# Patient Record
Sex: FEMALE | Race: WHITE | Employment: FULL TIME | URBAN - METROPOLITAN AREA
[De-identification: names, ages, dates, MRNs, and addresses within clinical notes are randomized per-mention and may not be internally consistent; named-entity substitution may affect disease eponyms.]

---

## 2018-07-23 ENCOUNTER — HOSPITAL ENCOUNTER (OUTPATIENT)
Dept: GENERAL RADIOLOGY | Age: 38
Discharge: HOME | End: 2018-07-23

## 2018-07-23 ENCOUNTER — HOSPITAL ENCOUNTER (OUTPATIENT)
Dept: MAMMOGRAPHY | Age: 38
Discharge: HOME | End: 2018-07-23
Payer: COMMERCIAL

## 2018-07-23 DIAGNOSIS — Z12.39 SCREENING BREAST EXAMINATION: ICD-10-CM

## 2018-07-23 PROCEDURE — 77067 SCR MAMMO BI INCL CAD: CPT

## 2018-08-01 ENCOUNTER — HOSPITAL ENCOUNTER (OUTPATIENT)
Dept: MAMMOGRAPHY | Age: 38
Discharge: HOME | End: 2018-08-01
Attending: FAMILY MEDICINE
Payer: COMMERCIAL

## 2018-08-01 DIAGNOSIS — R92.8 ABNORMAL MAMMOGRAM: ICD-10-CM

## 2018-08-01 PROCEDURE — 77065 DX MAMMO INCL CAD UNI: CPT

## 2018-08-03 ENCOUNTER — HOSPITAL ENCOUNTER (OUTPATIENT)
Dept: MAMMOGRAPHY | Age: 38
Discharge: HOME | End: 2018-08-03
Attending: FAMILY MEDICINE
Payer: COMMERCIAL

## 2018-08-03 DIAGNOSIS — R92.1 BREAST CALCIFICATIONS ON MAMMOGRAM: ICD-10-CM

## 2018-08-03 PROCEDURE — 88305 TISSUE EXAM BY PATHOLOGIST: CPT | Performed by: RADIOLOGY

## 2018-08-03 PROCEDURE — 74011000250 HC RX REV CODE- 250: Performed by: RADIOLOGY

## 2018-08-03 PROCEDURE — 77030030538 MAM STEREO VAC  BX BREAST LT 1ST LESION W/CLIP AND SPECIMEN

## 2018-08-03 PROCEDURE — 77065 DX MAMMO INCL CAD UNI: CPT

## 2018-08-03 PROCEDURE — 74011250636 HC RX REV CODE- 250/636: Performed by: RADIOLOGY

## 2018-08-03 RX ORDER — LIDOCAINE HYDROCHLORIDE 10 MG/ML
15 INJECTION INFILTRATION; PERINEURAL
Status: COMPLETED | OUTPATIENT
Start: 2018-08-03 | End: 2018-08-03

## 2018-08-03 RX ORDER — LIDOCAINE HYDROCHLORIDE AND EPINEPHRINE 10; 10 MG/ML; UG/ML
10 INJECTION, SOLUTION INFILTRATION; PERINEURAL ONCE
Status: COMPLETED | OUTPATIENT
Start: 2018-08-03 | End: 2018-08-03

## 2018-08-03 RX ADMIN — LIDOCAINE HYDROCHLORIDE AND EPINEPHRINE 100 MG: 10; 10 INJECTION, SOLUTION INFILTRATION; PERINEURAL at 09:50

## 2018-08-03 RX ADMIN — LIDOCAINE HYDROCHLORIDE 15 ML: 10 INJECTION, SOLUTION INFILTRATION; PERINEURAL at 09:50

## 2018-08-03 NOTE — PROGRESS NOTES
Patient tolerated left breast biopsy well with minimal bleeding. Biopsy site was bandaged in the usual fashion and discharge instructions were reviewed with the patient. She was provided a written copy as well. Advised the patient that results should be ready by Tuesday August 7 and that she can expect a phone call in the afternoon. Encouraged her to call with any questions or concerns.

## 2018-08-07 NOTE — PROGRESS NOTES
Pathology reviewed by Dr. Gayle Feliciano, called the patient and conveyed benign biopsy results. Advised patient that she should follow up with mammograms annually. Patient states that the biopsy site is healing well. She reports a small \"lump\" at the area of the biopsy that is somewhat sensitive. Advised her that it is probably a hematoma that formed and that it will resolve over the course of a few weeks. Encouraged patient to call with any questions or concerns.

## 2019-08-21 ENCOUNTER — HOSPITAL ENCOUNTER (OUTPATIENT)
Dept: MAMMOGRAPHY | Age: 39
Discharge: HOME | End: 2019-08-21
Payer: COMMERCIAL

## 2019-08-21 DIAGNOSIS — Z12.39 BREAST SCREENING: ICD-10-CM

## 2019-08-21 PROCEDURE — 77067 SCR MAMMO BI INCL CAD: CPT

## 2020-10-19 ENCOUNTER — TRANSCRIBE ORDER (OUTPATIENT)
Dept: SCHEDULING | Age: 40
End: 2020-10-19

## 2020-10-19 DIAGNOSIS — Z12.31 VISIT FOR SCREENING MAMMOGRAM: Primary | ICD-10-CM

## 2020-11-16 ENCOUNTER — HOSPITAL ENCOUNTER (OUTPATIENT)
Dept: MAMMOGRAPHY | Age: 40
Discharge: HOME | End: 2020-11-16
Payer: COMMERCIAL

## 2020-11-16 DIAGNOSIS — Z12.31 VISIT FOR SCREENING MAMMOGRAM: ICD-10-CM

## 2020-11-16 PROCEDURE — 77067 SCR MAMMO BI INCL CAD: CPT | Performed by: FAMILY MEDICINE

## 2022-06-03 LAB — MAMMOGRAPHY, EXTERNAL: NORMAL

## 2022-12-15 ENCOUNTER — APPOINTMENT (OUTPATIENT)
Dept: CT IMAGING | Age: 42
DRG: 390 | End: 2022-12-15
Attending: EMERGENCY MEDICINE
Payer: COMMERCIAL

## 2022-12-15 ENCOUNTER — HOSPITAL ENCOUNTER (INPATIENT)
Age: 42
LOS: 4 days | Discharge: HOME | DRG: 390 | End: 2022-12-20
Attending: EMERGENCY MEDICINE | Admitting: SURGERY
Payer: COMMERCIAL

## 2022-12-15 DIAGNOSIS — K56.609 SBO (SMALL BOWEL OBSTRUCTION) (HCC): Primary | ICD-10-CM

## 2022-12-15 LAB
ALBUMIN SERPL-MCNC: 3.8 G/DL (ref 3.5–5)
ALBUMIN/GLOB SERPL: 1.1 {RATIO} (ref 1.1–2.2)
ALP SERPL-CCNC: 90 U/L (ref 45–117)
ALT SERPL-CCNC: 21 U/L (ref 12–78)
ANION GAP SERPL CALC-SCNC: 11 MMOL/L (ref 5–15)
AST SERPL-CCNC: 22 U/L (ref 15–37)
BASOPHILS # BLD: 0.1 K/UL (ref 0–0.1)
BASOPHILS NFR BLD: 1 % (ref 0–1)
BILIRUB SERPL-MCNC: 0.4 MG/DL (ref 0.2–1)
BUN SERPL-MCNC: 9 MG/DL (ref 6–20)
BUN/CREAT SERPL: 11 (ref 12–20)
CALCIUM SERPL-MCNC: 9.5 MG/DL (ref 8.5–10.1)
CHLORIDE SERPL-SCNC: 106 MMOL/L (ref 97–108)
CO2 SERPL-SCNC: 25 MMOL/L (ref 21–32)
CREAT SERPL-MCNC: 0.79 MG/DL (ref 0.55–1.02)
DIFFERENTIAL METHOD BLD: ABNORMAL
EOSINOPHIL # BLD: 0.2 K/UL (ref 0–0.4)
EOSINOPHIL NFR BLD: 2 % (ref 0–7)
ERYTHROCYTE [DISTWIDTH] IN BLOOD BY AUTOMATED COUNT: 12.3 % (ref 11.5–14.5)
GLOBULIN SER CALC-MCNC: 3.6 G/DL (ref 2–4)
GLUCOSE SERPL-MCNC: 97 MG/DL (ref 65–100)
HCG UR QL: NEGATIVE
HCT VFR BLD AUTO: 40.5 % (ref 35–47)
HGB BLD-MCNC: 13.4 G/DL (ref 11.5–16)
IMM GRANULOCYTES # BLD AUTO: 0 K/UL (ref 0–0.04)
IMM GRANULOCYTES NFR BLD AUTO: 0 % (ref 0–0.5)
LIPASE SERPL-CCNC: 190 U/L (ref 73–393)
LYMPHOCYTES # BLD: 0.6 K/UL (ref 0.8–3.5)
LYMPHOCYTES NFR BLD: 8 % (ref 12–49)
MCH RBC QN AUTO: 29.8 PG (ref 26–34)
MCHC RBC AUTO-ENTMCNC: 33.1 G/DL (ref 30–36.5)
MCV RBC AUTO: 90.2 FL (ref 80–99)
MONOCYTES # BLD: 0.8 K/UL (ref 0–1)
MONOCYTES NFR BLD: 10 % (ref 5–13)
NEUTS SEG # BLD: 6.3 K/UL (ref 1.8–8)
NEUTS SEG NFR BLD: 79 % (ref 32–75)
NRBC # BLD: 0 K/UL (ref 0–0.01)
NRBC BLD-RTO: 0 PER 100 WBC
PLATELET # BLD AUTO: 238 K/UL (ref 150–400)
PMV BLD AUTO: 9.5 FL (ref 8.9–12.9)
POTASSIUM SERPL-SCNC: 3.5 MMOL/L (ref 3.5–5.1)
PROT SERPL-MCNC: 7.4 G/DL (ref 6.4–8.2)
RBC # BLD AUTO: 4.49 M/UL (ref 3.8–5.2)
RBC MORPH BLD: ABNORMAL
SODIUM SERPL-SCNC: 142 MMOL/L (ref 136–145)
WBC # BLD AUTO: 8 K/UL (ref 3.6–11)

## 2022-12-15 PROCEDURE — 96374 THER/PROPH/DIAG INJ IV PUSH: CPT

## 2022-12-15 PROCEDURE — 83690 ASSAY OF LIPASE: CPT

## 2022-12-15 PROCEDURE — 94762 N-INVAS EAR/PLS OXIMTRY CONT: CPT

## 2022-12-15 PROCEDURE — 81025 URINE PREGNANCY TEST: CPT

## 2022-12-15 PROCEDURE — 85025 COMPLETE CBC W/AUTO DIFF WBC: CPT

## 2022-12-15 PROCEDURE — 74177 CT ABD & PELVIS W/CONTRAST: CPT

## 2022-12-15 PROCEDURE — 74011250636 HC RX REV CODE- 250/636: Performed by: EMERGENCY MEDICINE

## 2022-12-15 PROCEDURE — 96375 TX/PRO/DX INJ NEW DRUG ADDON: CPT

## 2022-12-15 PROCEDURE — 36415 COLL VENOUS BLD VENIPUNCTURE: CPT

## 2022-12-15 PROCEDURE — 0D9670Z DRAINAGE OF STOMACH WITH DRAINAGE DEVICE, VIA NATURAL OR ARTIFICIAL OPENING: ICD-10-PCS | Performed by: SURGERY

## 2022-12-15 PROCEDURE — 99285 EMERGENCY DEPT VISIT HI MDM: CPT

## 2022-12-15 PROCEDURE — 80053 COMPREHEN METABOLIC PANEL: CPT

## 2022-12-15 RX ORDER — ONDANSETRON 2 MG/ML
4 INJECTION INTRAMUSCULAR; INTRAVENOUS
Status: COMPLETED | OUTPATIENT
Start: 2022-12-15 | End: 2022-12-15

## 2022-12-15 RX ORDER — FENTANYL CITRATE 50 UG/ML
25 INJECTION, SOLUTION INTRAMUSCULAR; INTRAVENOUS
Status: COMPLETED | OUTPATIENT
Start: 2022-12-15 | End: 2022-12-15

## 2022-12-15 RX ADMIN — SODIUM CHLORIDE 1000 ML: 9 INJECTION, SOLUTION INTRAVENOUS at 22:52

## 2022-12-15 RX ADMIN — FENTANYL CITRATE 25 MCG: 50 INJECTION, SOLUTION INTRAMUSCULAR; INTRAVENOUS at 22:47

## 2022-12-15 RX ADMIN — ONDANSETRON 4 MG: 2 INJECTION INTRAMUSCULAR; INTRAVENOUS at 22:47

## 2022-12-16 ENCOUNTER — APPOINTMENT (OUTPATIENT)
Dept: GENERAL RADIOLOGY | Age: 42
DRG: 390 | End: 2022-12-16
Attending: EMERGENCY MEDICINE
Payer: COMMERCIAL

## 2022-12-16 PROBLEM — Z85.048 HISTORY OF RECTAL CANCER: Status: ACTIVE | Noted: 2022-12-16

## 2022-12-16 PROBLEM — K56.609 INTESTINAL OBSTRUCTION (HCC): Status: ACTIVE | Noted: 2022-12-16

## 2022-12-16 PROBLEM — F41.9 ANXIETY: Status: ACTIVE | Noted: 2022-12-16

## 2022-12-16 PROBLEM — Z93.3 COLOSTOMY IN PLACE (HCC): Status: ACTIVE | Noted: 2022-12-16

## 2022-12-16 PROCEDURE — 99223 1ST HOSP IP/OBS HIGH 75: CPT | Performed by: SURGERY

## 2022-12-16 PROCEDURE — 74011250637 HC RX REV CODE- 250/637: Performed by: SURGERY

## 2022-12-16 PROCEDURE — 74011250636 HC RX REV CODE- 250/636: Performed by: EMERGENCY MEDICINE

## 2022-12-16 PROCEDURE — 65270000029 HC RM PRIVATE

## 2022-12-16 PROCEDURE — 74011250636 HC RX REV CODE- 250/636: Performed by: SURGERY

## 2022-12-16 PROCEDURE — 74011000636 HC RX REV CODE- 636: Performed by: SURGERY

## 2022-12-16 PROCEDURE — 96376 TX/PRO/DX INJ SAME DRUG ADON: CPT

## 2022-12-16 PROCEDURE — 74018 RADEX ABDOMEN 1 VIEW: CPT

## 2022-12-16 PROCEDURE — 74011000636 HC RX REV CODE- 636: Performed by: EMERGENCY MEDICINE

## 2022-12-16 PROCEDURE — 74011000250 HC RX REV CODE- 250: Performed by: EMERGENCY MEDICINE

## 2022-12-16 RX ORDER — SODIUM CHLORIDE AND POTASSIUM CHLORIDE 150; 900 MG/100ML; MG/100ML
INJECTION, SOLUTION INTRAVENOUS CONTINUOUS
Status: DISCONTINUED | OUTPATIENT
Start: 2022-12-16 | End: 2022-12-20 | Stop reason: HOSPADM

## 2022-12-16 RX ORDER — LORAZEPAM 1 MG/1
1 TABLET ORAL
Status: DISCONTINUED | OUTPATIENT
Start: 2022-12-16 | End: 2022-12-20 | Stop reason: HOSPADM

## 2022-12-16 RX ORDER — DIPHENHYDRAMINE HYDROCHLORIDE 50 MG/ML
12.5 INJECTION, SOLUTION INTRAMUSCULAR; INTRAVENOUS
Status: DISPENSED | OUTPATIENT
Start: 2022-12-16 | End: 2022-12-17

## 2022-12-16 RX ORDER — LIDOCAINE HYDROCHLORIDE 40 MG/ML
SOLUTION TOPICAL
Status: COMPLETED | OUTPATIENT
Start: 2022-12-16 | End: 2022-12-16

## 2022-12-16 RX ORDER — ONDANSETRON 2 MG/ML
4 INJECTION INTRAMUSCULAR; INTRAVENOUS
Status: DISCONTINUED | OUTPATIENT
Start: 2022-12-16 | End: 2022-12-20 | Stop reason: HOSPADM

## 2022-12-16 RX ORDER — DIPHENHYDRAMINE HYDROCHLORIDE 50 MG/ML
12.5 INJECTION, SOLUTION INTRAMUSCULAR; INTRAVENOUS
Status: DISCONTINUED | OUTPATIENT
Start: 2022-12-16 | End: 2022-12-20 | Stop reason: HOSPADM

## 2022-12-16 RX ORDER — HYDROCODONE BITARTRATE AND ACETAMINOPHEN 10; 325 MG/1; MG/1
1 TABLET ORAL
Status: DISCONTINUED | OUTPATIENT
Start: 2022-12-16 | End: 2022-12-20 | Stop reason: HOSPADM

## 2022-12-16 RX ORDER — NALOXONE HYDROCHLORIDE 0.4 MG/ML
0.4 INJECTION, SOLUTION INTRAMUSCULAR; INTRAVENOUS; SUBCUTANEOUS AS NEEDED
Status: DISCONTINUED | OUTPATIENT
Start: 2022-12-16 | End: 2022-12-20 | Stop reason: HOSPADM

## 2022-12-16 RX ORDER — HYDROMORPHONE HYDROCHLORIDE 1 MG/ML
.5-1 INJECTION, SOLUTION INTRAMUSCULAR; INTRAVENOUS; SUBCUTANEOUS
Status: DISCONTINUED | OUTPATIENT
Start: 2022-12-16 | End: 2022-12-20 | Stop reason: HOSPADM

## 2022-12-16 RX ORDER — HYDROCODONE BITARTRATE AND ACETAMINOPHEN 5; 325 MG/1; MG/1
1 TABLET ORAL
Status: DISCONTINUED | OUTPATIENT
Start: 2022-12-16 | End: 2022-12-20 | Stop reason: HOSPADM

## 2022-12-16 RX ORDER — ACETAMINOPHEN 325 MG/1
650 TABLET ORAL
Status: DISCONTINUED | OUTPATIENT
Start: 2022-12-16 | End: 2022-12-20 | Stop reason: HOSPADM

## 2022-12-16 RX ORDER — FENTANYL CITRATE 50 UG/ML
25 INJECTION, SOLUTION INTRAMUSCULAR; INTRAVENOUS
Status: COMPLETED | OUTPATIENT
Start: 2022-12-16 | End: 2022-12-16

## 2022-12-16 RX ORDER — PROCHLORPERAZINE EDISYLATE 5 MG/ML
10 INJECTION INTRAMUSCULAR; INTRAVENOUS
Status: COMPLETED | OUTPATIENT
Start: 2022-12-16 | End: 2022-12-16

## 2022-12-16 RX ADMIN — DIATRIZOATE MEGLUMINE AND DIATRIZOATE SODIUM 80 ML: 660; 100 LIQUID ORAL; RECTAL at 21:32

## 2022-12-16 RX ADMIN — IOPAMIDOL 100 ML: 755 INJECTION, SOLUTION INTRAVENOUS at 00:07

## 2022-12-16 RX ADMIN — PROCHLORPERAZINE EDISYLATE 10 MG: 5 INJECTION INTRAMUSCULAR; INTRAVENOUS at 03:43

## 2022-12-16 RX ADMIN — FENTANYL CITRATE 25 MCG: 50 INJECTION, SOLUTION INTRAMUSCULAR; INTRAVENOUS at 00:48

## 2022-12-16 RX ADMIN — POTASSIUM CHLORIDE AND SODIUM CHLORIDE: 900; 150 INJECTION, SOLUTION INTRAVENOUS at 02:16

## 2022-12-16 RX ADMIN — POTASSIUM CHLORIDE AND SODIUM CHLORIDE: 900; 150 INJECTION, SOLUTION INTRAVENOUS at 11:29

## 2022-12-16 RX ADMIN — LORAZEPAM 1 MG: 1 TABLET ORAL at 08:53

## 2022-12-16 RX ADMIN — ONDANSETRON 4 MG: 2 INJECTION INTRAMUSCULAR; INTRAVENOUS at 02:45

## 2022-12-16 RX ADMIN — DIPHENHYDRAMINE HYDROCHLORIDE 12.5 MG: 50 INJECTION, SOLUTION INTRAMUSCULAR; INTRAVENOUS at 08:52

## 2022-12-16 RX ADMIN — LIDOCAINE HYDROCHLORIDE: 40 SOLUTION ORAL at 01:39

## 2022-12-16 RX ADMIN — HYDROMORPHONE HYDROCHLORIDE 0.5 MG: 1 INJECTION, SOLUTION INTRAMUSCULAR; INTRAVENOUS; SUBCUTANEOUS at 03:56

## 2022-12-16 NOTE — ED PROVIDER NOTES
EMERGENCY DEPARTMENT HISTORY AND PHYSICAL EXAM     ----------------------------------------------------------------------------  Please note that this dictation was completed with [employer], the computer voice recognition software. Quite often unanticipated grammatical, syntax, homophones, and other interpretive errors are inadvertently transcribed by the computer software. Please disregard these errors. Please excuse any errors that have escaped final proofreading  ----------------------------------------------------------------------------      Date: 12/15/2022  Patient Name: Ingrid Molina    History of Presenting Illness     Chief Complaint   Patient presents with    Abdominal Pain     Patient hx of rectal cancer with colostomy placement in September. Has only had couple tablespoons of output today and has severe abdominal cramping with a couple episodes of vomiting. Took milk of magnesia PTA and says it's helping some but not much. No output in colostomy in triage. Not had chemo since August.        History Provided By:  Patient    HPI: Ingrid Molina is a 38 y.o. female, with significant pmhx of rectal cancer status postchemotherapy and resection with ostomy, who presents via private vehicle to the ED with c/o severe abdominal cramping that comes in waves with associated nausea and vomiting and decreased ostomy output in the last 24 hours. Has not had any gas come out of her bag either. Patient also specifically denies any associated fevers, chills, CP, SOB, urinary sxs, or headache. Social Hx: denies tobacco  denies EtOH , denies recreational/Illicit Drugs    There are no other complaints, changes, or physical findings at this time. PCP: Dulce Maria Jean-Baptiste MD    Allergies   Allergen Reactions    Latex, Natural Rubber Rash    Morphine Nausea and Vomiting           Past History     Past Medical History:  No past medical history on file.     Past Surgical History:  Past Surgical History:   Procedure Laterality Date    HX BREAST BIOPSY Left     benign       Family History:  No family history on file. Social History: Allergies: Allergies   Allergen Reactions    Latex, Natural Rubber Rash    Morphine Nausea and Vomiting         Review of Systems   Review of Systems   Constitutional: Negative. Negative for fever. Eyes: Negative. Respiratory: Negative. Negative for shortness of breath. Cardiovascular:  Negative for chest pain. Gastrointestinal:  Positive for abdominal pain, nausea and vomiting. Endocrine: Negative. Genitourinary: Negative. Negative for difficulty urinating, dysuria and hematuria. Musculoskeletal: Negative. Skin: Negative. Neurological: Negative. Psychiatric/Behavioral:  Negative for suicidal ideas. Physical Exam   Physical Exam  Vitals and nursing note reviewed. Constitutional:       General: She is in acute distress. Appearance: She is well-developed. She is not diaphoretic. HENT:      Head: Normocephalic and atraumatic. Nose: Nose normal.   Eyes:      General: No scleral icterus. Conjunctiva/sclera: Conjunctivae normal.   Neck:      Trachea: No tracheal deviation. Cardiovascular:      Rate and Rhythm: Normal rate and regular rhythm. Heart sounds: Normal heart sounds. No murmur heard. No friction rub. Pulmonary:      Effort: Pulmonary effort is normal. No respiratory distress. Breath sounds: Normal breath sounds. No stridor. No wheezing or rales. Abdominal:      General: Bowel sounds are absent. There is no distension. Palpations: Abdomen is soft. Tenderness: There is no abdominal tenderness. Comments: Pink, patent os, no stool in bag, no gas   Musculoskeletal:         General: No tenderness. Normal range of motion. Cervical back: Normal range of motion. Skin:     General: Skin is warm and dry. Findings: No rash.    Neurological:      Mental Status: She is alert and oriented to person, place, and time. Cranial Nerves: No cranial nerve deficit. Psychiatric:         Behavior: Behavior normal.         Thought Content: Thought content normal.         Judgment: Judgment normal.         Diagnostic Study Results     Labs -     Recent Results (from the past 12 hour(s))   METABOLIC PANEL, COMPREHENSIVE    Collection Time: 12/15/22  9:53 PM   Result Value Ref Range    Sodium 142 136 - 145 mmol/L    Potassium 3.5 3.5 - 5.1 mmol/L    Chloride 106 97 - 108 mmol/L    CO2 25 21 - 32 mmol/L    Anion gap 11 5 - 15 mmol/L    Glucose 97 65 - 100 mg/dL    BUN 9 6 - 20 MG/DL    Creatinine 0.79 0.55 - 1.02 MG/DL    BUN/Creatinine ratio 11 (L) 12 - 20      eGFR >60 >60 ml/min/1.73m2    Calcium 9.5 8.5 - 10.1 MG/DL    Bilirubin, total 0.4 0.2 - 1.0 MG/DL    ALT (SGPT) 21 12 - 78 U/L    AST (SGOT) 22 15 - 37 U/L    Alk. phosphatase 90 45 - 117 U/L    Protein, total 7.4 6.4 - 8.2 g/dL    Albumin 3.8 3.5 - 5.0 g/dL    Globulin 3.6 2.0 - 4.0 g/dL    A-G Ratio 1.1 1.1 - 2.2     CBC WITH AUTOMATED DIFF    Collection Time: 12/15/22  9:53 PM   Result Value Ref Range    WBC 8.0 3.6 - 11.0 K/uL    RBC 4.49 3.80 - 5.20 M/uL    HGB 13.4 11.5 - 16.0 g/dL    HCT 40.5 35.0 - 47.0 %    MCV 90.2 80.0 - 99.0 FL    MCH 29.8 26.0 - 34.0 PG    MCHC 33.1 30.0 - 36.5 g/dL    RDW 12.3 11.5 - 14.5 %    PLATELET 942 842 - 876 K/uL    MPV 9.5 8.9 - 12.9 FL    NRBC 0.0 0  WBC    ABSOLUTE NRBC 0.00 0.00 - 0.01 K/uL    NEUTROPHILS 79 (H) 32 - 75 %    LYMPHOCYTES 8 (L) 12 - 49 %    MONOCYTES 10 5 - 13 %    EOSINOPHILS 2 0 - 7 %    BASOPHILS 1 0 - 1 %    IMMATURE GRANULOCYTES 0 0.0 - 0.5 %    ABS. NEUTROPHILS 6.3 1.8 - 8.0 K/UL    ABS. LYMPHOCYTES 0.6 (L) 0.8 - 3.5 K/UL    ABS. MONOCYTES 0.8 0.0 - 1.0 K/UL    ABS. EOSINOPHILS 0.2 0.0 - 0.4 K/UL    ABS. BASOPHILS 0.1 0.0 - 0.1 K/UL    ABS. IMM.  GRANS. 0.0 0.00 - 0.04 K/UL    DF SMEAR SCANNED      RBC COMMENTS NORMOCYTIC, NORMOCHROMIC     LIPASE    Collection Time: 12/15/22  9:53 PM Result Value Ref Range    Lipase 190 73 - 393 U/L   HCG URINE, QL. - POC    Collection Time: 12/15/22 10:58 PM   Result Value Ref Range    Pregnancy test,urine (POC) Negative NEG         Radiologic Studies -   CT ABD PELV W CONT   Final Result   Small bowel obstruction with a transition point in the mid lower abdomen likely   due to adhesion. Moderate to large amount of colonic stool with a left lower   abdomen ostomy. CT Results  (Last 48 hours)                 12/16/22 0006  CT ABD PELV W CONT Final result    Impression:  Small bowel obstruction with a transition point in the mid lower abdomen likely   due to adhesion. Moderate to large amount of colonic stool with a left lower   abdomen ostomy. Narrative:  EXAM:  CT ABD PELV W CONT       INDICATION: Abdominal pain. Rectal cancer. Decreased ostomy output. COMPARISON: None. TECHNIQUE: Helical CT of the abdomen  and pelvis  following the uneventful   intravenous administration of nonionic contrast.  Coronal and sagittal reformats   are performed. CT dose reduction was achieved through use of a standardized   protocol tailored for this examination and automatic exposure control for dose   modulation. FINDINGS:    The visualized lung bases demonstrate no mass or consolidation. The heart size   is normal. There is no pericardial or pleural effusion. There is a tiny low density right liver lesion that is too small to characterize   but likely represents a cyst for which no imaging follow-up is recommended. The   spleen, pancreas, and adrenal glands are normal. The gall bladder is present    without intra- or extra-hepatic biliary dilatation. The kidneys are symmetric without hydronephrosis. There are dilated fluid-filled small bowel loops measuring up to 3.0 cm in   diameter with a transition point in the mid lower abdomen. The distal small   bowel and colon are nondilated.  There is a moderate to large amount of colonic   stool. Distal colon surgical changes are noted with a left lower abdomen ostomy. The appendix is normal.         There are no enlarged lymph nodes. There is no free fluid or free air. The   aorta is normal in caliber. The urinary bladder is normal. A uterine fibroid measures 4.1 cm. There is no aggressive bony lesion. CXR Results  (Last 48 hours)      None              Medical Decision Making   I am the first provider for this patient. I reviewed the vital signs, available nursing notes, past medical history, past surgical history, family history and social history. Vital Signs-Reviewed the patient's vital signs. Patient Vitals for the past 12 hrs:   Temp Pulse Resp BP SpO2   12/16/22 0045 -- 68 13 122/86 99 %   12/15/22 2315 -- 62 14 116/79 96 %   12/15/22 2245 -- 65 12 -- 100 %   12/15/22 2230 -- 70 15 -- 100 %   12/15/22 2217 -- (!) 59 15 116/71 99 %   12/15/22 2200 -- 65 20 -- 100 %   12/15/22 2132 98.4 °F (36.9 °C) 77 20 114/79 100 %       Pulse Oximetry Analysis - 99% on RA, normal  Rate: 77 bpm  Rhythm: Normal sinus    ED Course:   Initial assessment performed. The patients presenting problems have been discussed, and they are in agreement with the care plan formulated and outlined with them. I have encouraged them to ask questions as they arise throughout their visit. I reviewed the nursing notes and and vital signs from today's visit, as well as the electronic medical record system for any past medical records that were available that may contribute to the patients current condition, including previous notes from [place] for recent resection    Nursing notes will be reviewed as they become available in realtime while the pt has been in the ED. Arabella Bailey MD  I personally reviewed/interpreted pt's imaging. Agree with official read by radiology as noted above.   Arabella Bailey MD      Provider Notes (Medical Decision Making):     DDX:  Small bowel obstruction, dehydration, electrolyte derangement    CONSULT NOTE:   1:06 AM  Bernard Blackmon MD spoke with Dr. Ab Escobar,   Specialty: Kerry Escobar due to p.o. Discussed pt's HPI and available diagnostic results thus far, specifically noting normal white count, electrolytes. Consultant recommends NG tube and will admit. Bernard Blackmon MD      Impression/ Plan:  Patient is a 47-year-old female with history of rectal cancer status postchemotherapy and resection now presenting with severe abdominal pain, cramping and nausea vomiting. CT confirms initial concern for small bowel obstruction. Have consulted with general surgery who recommends NG tube placement and will admit to their service. ADMISSION NOTE:  1:06 AM  Patient is being admitted to the hospital by Dr. Ab Escobar. The results of their tests and reasons for their admission have been discussed with them and/or available family. They convey agreement and understanding for the need to be admitted and for their admission diagnosis. Bernard Blackmon MD           Critical Care Time:     none      Diagnosis     Clinical Impression:   1. SBO (small bowel obstruction) (HCC)        PLAN:  1.  Admit to hospitalist

## 2022-12-16 NOTE — PROGRESS NOTES
Pt is a 39 yo female with dx of SBO. Pt hx of rectal cancer, dx'd early 2022 and through [facility] in [place] and [address] [place] [address],  pt had neoadjuvant chemorx and XRT then Sept 2022 had robotic APR and pelvic flap with end colostomy. Pt now with N/V and SBO on CT. CBC and electrolytes ok. Pt cancer was T4N2 on review of records available in care everywhere. Plan NGT, IVF,  Gastrograffin challenge with FU AXR. Plan non op initial management.

## 2022-12-16 NOTE — ED NOTES
Received report from outgoing nurse jahaira   Patient is well and stable GCS15  Conscious coherent conversant alert   Lying comfortably in bed   Iv cannula intact   No complaints of pain

## 2022-12-16 NOTE — ED NOTES
Patient report given to Shilpa Grover   Patient is well and stable GCS15  Not in any form of distress   No complaints of pain   NGT intact on NPO   Iv cannula infusing well

## 2022-12-16 NOTE — H&P
Surgery History and Physical    Subjective:      Leslee Joy is a 38 y.o. female who presents for evaluation of vaginal obstruction    Pt is a 37 yo female with dx of SBO. Pt hx of rectal cancer, dx'd early 2022 and through [facility] in [place] and [address] [place] [address],  pt had neoadjuvant chemorx and XRT then Sept 2022 had robotic APR and pelvic flap with end colostomy. Pt now with N/V and SBO on CT. CBC and electrolytes ok. Pt cancer was T4N2 on review of records available in care everywhere. Patient with NG tube in place and minimal mucousy output, patient also noted decreased colostomy output. Apparently during her surgery at [facility] she was discharged 2 days postop but readmitted several days after discharge with ileus, hospitalized for 4 days and then discharged home. Plan NGT, IVF,  Gastrograffin challenge with FU AXR. Plan non op initial management. Patient currently reports no nausea and no abdominal pain. Has been using a high-fiber diet at home    LAB REVIEW:    Recent Results (from the past 48 hour(s))   METABOLIC PANEL, COMPREHENSIVE    Collection Time: 12/15/22  9:53 PM   Result Value Ref Range    Sodium 142 136 - 145 mmol/L    Potassium 3.5 3.5 - 5.1 mmol/L    Chloride 106 97 - 108 mmol/L    CO2 25 21 - 32 mmol/L    Anion gap 11 5 - 15 mmol/L    Glucose 97 65 - 100 mg/dL    BUN 9 6 - 20 MG/DL    Creatinine 0.79 0.55 - 1.02 MG/DL    BUN/Creatinine ratio 11 (L) 12 - 20      eGFR >60 >60 ml/min/1.73m2    Calcium 9.5 8.5 - 10.1 MG/DL    Bilirubin, total 0.4 0.2 - 1.0 MG/DL    ALT (SGPT) 21 12 - 78 U/L    AST (SGOT) 22 15 - 37 U/L    Alk.  phosphatase 90 45 - 117 U/L    Protein, total 7.4 6.4 - 8.2 g/dL    Albumin 3.8 3.5 - 5.0 g/dL    Globulin 3.6 2.0 - 4.0 g/dL    A-G Ratio 1.1 1.1 - 2.2     CBC WITH AUTOMATED DIFF    Collection Time: 12/15/22  9:53 PM   Result Value Ref Range    WBC 8.0 3.6 - 11.0 K/uL    RBC 4.49 3.80 - 5.20 M/uL    HGB 13.4 11.5 - 16.0 g/dL    HCT 40.5 35.0 - 47.0 % MCV 90.2 80.0 - 99.0 FL    MCH 29.8 26.0 - 34.0 PG    MCHC 33.1 30.0 - 36.5 g/dL    RDW 12.3 11.5 - 14.5 %    PLATELET 957 708 - 128 K/uL    MPV 9.5 8.9 - 12.9 FL    NRBC 0.0 0  WBC    ABSOLUTE NRBC 0.00 0.00 - 0.01 K/uL    NEUTROPHILS 79 (H) 32 - 75 %    LYMPHOCYTES 8 (L) 12 - 49 %    MONOCYTES 10 5 - 13 %    EOSINOPHILS 2 0 - 7 %    BASOPHILS 1 0 - 1 %    IMMATURE GRANULOCYTES 0 0.0 - 0.5 %    ABS. NEUTROPHILS 6.3 1.8 - 8.0 K/UL    ABS. LYMPHOCYTES 0.6 (L) 0.8 - 3.5 K/UL    ABS. MONOCYTES 0.8 0.0 - 1.0 K/UL    ABS. EOSINOPHILS 0.2 0.0 - 0.4 K/UL    ABS. BASOPHILS 0.1 0.0 - 0.1 K/UL    ABS. IMM. GRANS. 0.0 0.00 - 0.04 K/UL    DF SMEAR SCANNED      RBC COMMENTS NORMOCYTIC, NORMOCHROMIC     LIPASE    Collection Time: 12/15/22  9:53 PM   Result Value Ref Range    Lipase 190 73 - 393 U/L   HCG URINE, QL. - POC    Collection Time: 12/15/22 10:58 PM   Result Value Ref Range    Pregnancy test,urine (POC) Negative NEG         XR Results (maximum last 3): Results from [place] encounter on 12/15/22    XR ABD (KUB)    Impression  The enteric tube terminates in the stomach. CT Results (maximum last 3): Results from [place] encounter on 12/15/22    CT ABD PELV W CONT    Impression  Small bowel obstruction with a transition point in the mid lower abdomen likely  due to adhesion. Moderate to large amount of colonic stool with a left lower  abdomen ostomy. MRI Results (maximum last 3): No results found for this or any previous visit. Nuclear Medicine Results (maximum last 3): No results found for this or any previous visit. US Results (maximum last 3): No results found for this or any previous visit. No past medical history on file. Past Surgical History:   Procedure Laterality Date    HX BREAST BIOPSY Left     benign      No family history on file.   Social History     Tobacco Use    Smoking status: Not on file    Smokeless tobacco: Not on file   Substance Use Topics Alcohol use: Not on file      Prior to Admission medications    Not on File      Allergies   Allergen Reactions    Latex, Natural Rubber Rash    Morphine Nausea and Vomiting       Review of Systems   Constitutional:  Positive for fatigue. Negative for unexpected weight change. Respiratory: Negative. Cardiovascular: Negative. Gastrointestinal:         Previous nausea vomiting and abdominal pain are improved. Patient very anxious appearing   All other systems reviewed and are negative. Objective:     Patient Vitals for the past 8 hrs:   BP Temp Pulse Resp SpO2   22 0731 114/72 98 °F (36.7 °C) 74 23 97 %   22 0429 124/80 98.3 °F (36.8 °C) 61 14 94 %   22 0330 -- -- 86 -- 99 %   22 0242 118/79 -- 95 18 97 %   22 0214 -- -- 89 19 98 %   22 0135 -- -- 80 15 95 %   22 0045 122/86 -- 68 13 99 %       Temp (24hrs), Av.2 °F (36.8 °C), Min:98 °F (36.7 °C), Max:98.4 °F (36.9 °C)      Physical Exam  Vitals and nursing note reviewed. Constitutional:       General: She is not in acute distress. Appearance: She is not ill-appearing. Comments: Thin, very anxious appearing   HENT:      Mouth/Throat:      Mouth: Mucous membranes are dry. Eyes:      Conjunctiva/sclera: Conjunctivae normal.   Cardiovascular:      Rate and Rhythm: Normal rate and regular rhythm. Pulmonary:      Effort: Pulmonary effort is normal.      Breath sounds: Normal breath sounds. Abdominal:      General: Abdomen is flat. Palpations: Abdomen is soft. Comments: Eagle tattoo, pink normally butting colostomy left lower abdomen, healed surgical robotic scars, nontender, flat, no peritoneal signs or guarding   Musculoskeletal:         General: Normal range of motion. Skin:     General: Skin is warm and dry. Neurological:      General: No focal deficit present. Mental Status: She is alert and oriented to person, place, and time. Psychiatric:         Thought Content:  Thought content normal.         Judgment: Judgment normal.      Comments: Very anxious appearing       Assessment:     Active Problems:    Intestinal obstruction ([address] (12/16/2022)      Anxiety (12/16/2022)      History of rectal cancer (12/16/2022)      Colostomy in place [facility]) (12/16/2022)      Plan:     N.p.o., NG tube, IV fluid resuscitation, Gastrografin challenge with follow-up x-rays. Reviewed the plan, likelihood this should improve on its own, potential for surgical intervention if needed laparoscopic or laparotomy, patient understands that at her previous low anterior resection as well as radiation to great risk of adhesions, hopefully this will improve on its own. Assured patient that we were more than capable to take care of her here but if she preferred we can transfer her to [facility] or [facility] but I thought that was not necessary at this time unless it was patient preference.   Patient seemed very anxious about all of this,I offered to speak to her  when he returned patient desired    FACE TO FACE TIME: (Review of imaging, hx, records, EMR, discussion with pt and providers, and  pt exam)                                    76    minutes reviewing old records and imaging through the course of the the time early this morning and at patient's bedside    Signed By: Rosales Horn MD   [facility]    December 16, 2022

## 2022-12-17 ENCOUNTER — APPOINTMENT (OUTPATIENT)
Dept: GENERAL RADIOLOGY | Age: 42
DRG: 390 | End: 2022-12-17
Attending: SURGERY
Payer: COMMERCIAL

## 2022-12-17 LAB
ANION GAP SERPL CALC-SCNC: 5 MMOL/L (ref 5–15)
BASOPHILS # BLD: 0 K/UL (ref 0–0.1)
BASOPHILS NFR BLD: 1 % (ref 0–1)
BUN SERPL-MCNC: 9 MG/DL (ref 6–20)
BUN/CREAT SERPL: 13 (ref 12–20)
CALCIUM SERPL-MCNC: 8.2 MG/DL (ref 8.5–10.1)
CHLORIDE SERPL-SCNC: 113 MMOL/L (ref 97–108)
CO2 SERPL-SCNC: 26 MMOL/L (ref 21–32)
CREAT SERPL-MCNC: 0.67 MG/DL (ref 0.55–1.02)
DIFFERENTIAL METHOD BLD: ABNORMAL
EOSINOPHIL # BLD: 0.1 K/UL (ref 0–0.4)
EOSINOPHIL NFR BLD: 2 % (ref 0–7)
ERYTHROCYTE [DISTWIDTH] IN BLOOD BY AUTOMATED COUNT: 12.4 % (ref 11.5–14.5)
GLUCOSE BLD STRIP.AUTO-MCNC: 91 MG/DL (ref 65–117)
GLUCOSE BLD STRIP.AUTO-MCNC: 96 MG/DL (ref 65–117)
GLUCOSE SERPL-MCNC: 87 MG/DL (ref 65–100)
HCT VFR BLD AUTO: 38.3 % (ref 35–47)
HGB BLD-MCNC: 12.2 G/DL (ref 11.5–16)
IMM GRANULOCYTES # BLD AUTO: 0 K/UL (ref 0–0.04)
IMM GRANULOCYTES NFR BLD AUTO: 1 % (ref 0–0.5)
LYMPHOCYTES # BLD: 0.5 K/UL (ref 0.8–3.5)
LYMPHOCYTES NFR BLD: 9 % (ref 12–49)
MCH RBC QN AUTO: 29.9 PG (ref 26–34)
MCHC RBC AUTO-ENTMCNC: 31.9 G/DL (ref 30–36.5)
MCV RBC AUTO: 93.9 FL (ref 80–99)
MONOCYTES # BLD: 0.7 K/UL (ref 0–1)
MONOCYTES NFR BLD: 12 % (ref 5–13)
NEUTS SEG # BLD: 4.4 K/UL (ref 1.8–8)
NEUTS SEG NFR BLD: 76 % (ref 32–75)
NRBC # BLD: 0 K/UL (ref 0–0.01)
NRBC BLD-RTO: 0 PER 100 WBC
PLATELET # BLD AUTO: 201 K/UL (ref 150–400)
PMV BLD AUTO: 9.1 FL (ref 8.9–12.9)
POTASSIUM SERPL-SCNC: 4.1 MMOL/L (ref 3.5–5.1)
RBC # BLD AUTO: 4.08 M/UL (ref 3.8–5.2)
SERVICE CMNT-IMP: NORMAL
SERVICE CMNT-IMP: NORMAL
SODIUM SERPL-SCNC: 144 MMOL/L (ref 136–145)
WBC # BLD AUTO: 5.8 K/UL (ref 3.6–11)

## 2022-12-17 PROCEDURE — 85025 COMPLETE CBC W/AUTO DIFF WBC: CPT

## 2022-12-17 PROCEDURE — 74011250636 HC RX REV CODE- 250/636: Performed by: SURGERY

## 2022-12-17 PROCEDURE — 65270000029 HC RM PRIVATE

## 2022-12-17 PROCEDURE — 74011250637 HC RX REV CODE- 250/637: Performed by: SURGERY

## 2022-12-17 PROCEDURE — 36415 COLL VENOUS BLD VENIPUNCTURE: CPT

## 2022-12-17 PROCEDURE — 80048 BASIC METABOLIC PNL TOTAL CA: CPT

## 2022-12-17 PROCEDURE — 74019 RADEX ABDOMEN 2 VIEWS: CPT

## 2022-12-17 PROCEDURE — 82962 GLUCOSE BLOOD TEST: CPT

## 2022-12-17 PROCEDURE — 99231 SBSQ HOSP IP/OBS SF/LOW 25: CPT | Performed by: SURGERY

## 2022-12-17 RX ADMIN — HYDROMORPHONE HYDROCHLORIDE 1 MG: 1 INJECTION, SOLUTION INTRAMUSCULAR; INTRAVENOUS; SUBCUTANEOUS at 11:44

## 2022-12-17 RX ADMIN — LORAZEPAM 1 MG: 1 TABLET ORAL at 00:20

## 2022-12-17 RX ADMIN — ONDANSETRON 4 MG: 2 INJECTION INTRAMUSCULAR; INTRAVENOUS at 17:30

## 2022-12-17 RX ADMIN — ONDANSETRON 4 MG: 2 INJECTION INTRAMUSCULAR; INTRAVENOUS at 10:42

## 2022-12-17 RX ADMIN — HYDROMORPHONE HYDROCHLORIDE 1 MG: 1 INJECTION, SOLUTION INTRAMUSCULAR; INTRAVENOUS; SUBCUTANEOUS at 17:29

## 2022-12-17 RX ADMIN — POTASSIUM CHLORIDE AND SODIUM CHLORIDE: 900; 150 INJECTION, SOLUTION INTRAVENOUS at 03:46

## 2022-12-17 RX ADMIN — POTASSIUM CHLORIDE AND SODIUM CHLORIDE: 900; 150 INJECTION, SOLUTION INTRAVENOUS at 11:44

## 2022-12-17 RX ADMIN — ONDANSETRON 4 MG: 2 INJECTION INTRAMUSCULAR; INTRAVENOUS at 05:19

## 2022-12-17 RX ADMIN — HYDROMORPHONE HYDROCHLORIDE 1 MG: 1 INJECTION, SOLUTION INTRAMUSCULAR; INTRAVENOUS; SUBCUTANEOUS at 00:41

## 2022-12-17 RX ADMIN — HYDROMORPHONE HYDROCHLORIDE 1 MG: 1 INJECTION, SOLUTION INTRAMUSCULAR; INTRAVENOUS; SUBCUTANEOUS at 05:17

## 2022-12-17 NOTE — PROGRESS NOTES
Orly Carlos RN primary nurse requested me to look at NG tube as pt c/o pain and NG tube not draining. NG tube was confirmed placement via xray this AM. Wall suction is functioning. Pt refusing pain medication at this time. Notified Dr. Meaghan Goodman of pt's complaints of pain and pt's concern that contrast was given via NG tube and has not drained out. Dr. Meaghan Goodman to see pt soon.

## 2022-12-17 NOTE — PROGRESS NOTES
End of Shift Note    Bedside shift change report given to Len Silva (oncoming nurse) by Garcia Melvin RN (offgoing nurse). Report included the following information SBAR, Kardex, Procedure Summary, Intake/Output, MAR, and Cardiac Rhythm Normal sinus rhythm. Shift worked:  [id number]     Shift summary and any significant changes:     Patient was comfortable with no complaints until contrast was given and NG was clamped, then pain returned. Patient was given IV dilaudid x2. Surgeon paged and writer instructed to connect NG back to suction a few hours earlier than the planned six hour clamp. Pt up to bathroom several times during night to void.  slept at bedside. Radiology did not complete patient's xray as ordered. Xray was done at bedside, not flat and erect as ordered. Tech stated someone told her patient could not stand but writer informed tech that was not true and offered to bring patient down for test. [employer] stated scan was already done. Concerns for physician to address:  none     Zone phone for oncoming shift:   [id number]       Activity:  Activity Level: Up ad harshil  Number times ambulated in hallways past shift: 0  Number of times OOB to chair past shift: 3    Cardiac:   Cardiac Monitoring: Yes      Cardiac Rhythm: Sinus Rhythm    Access:  Current line(s): PIV     Genitourinary:   Urinary status: voiding    Respiratory:   O2 Device: None (Room air)  Chronic home O2 use?: NO  Incentive spirometer at bedside: NO       GI:  Last Bowel Movement Date: 12/16/22  Current diet:  DIET NPO  Passing flatus: YES  Tolerating current diet: NO       Pain Management:   Patient states pain is manageable on current regimen: YES    Skin:  Ozzy Score: 19  Interventions: increase time out of bed    Patient Safety:  Fall Score:  Total Score: 1  Interventions: gripper socks and pt to call before getting OOB       Length of Stay:  Expected LOS: 2d 7h  Actual LOS: [address], RN, BSN

## 2022-12-17 NOTE — PROGRESS NOTES
0715-  Bedside and Verbal shift change report given to Allyn Murcia RN (oncoming nurse) by Jorge Stearns (offgoing nurse). Report included the following information SBAR, Kardex, Intake/Output, MAR, and Recent Results. End of Shift Note    Bedside shift change report given to Ernie Jung RN (oncoming nurse) by Tamala Shone, RN (offgoing nurse). Report included the following information SBAR, Kardex, Intake/Output, MAR, and Recent Results    Shift worked:  7a-7p     Shift summary and any significant changes:     Severe pain and vomiting. NG tube seems to be working. Concerns for physician to address:  none     Zone phone for oncoming shift:   [phone]       Activity:  Activity Level: Up ad harshil  Number times ambulated in hallways past shift: 0  Number of times OOB to chair past shift: 1    Cardiac:   Cardiac Monitoring: Yes      Cardiac Rhythm: Sinus Rhythm    Access:  Current line(s): PIV     Genitourinary:   Urinary status: voiding    Respiratory:   O2 Device: None (Room air)  Chronic home O2 use?: NO  Incentive spirometer at bedside: NO       GI:  Last Bowel Movement Date: 12/16/22  Current diet:  DIET NPO  Passing flatus: no  Tolerating current diet: NO       Pain Management:   Patient states pain is manageable on current regimen: YES    Skin:  Ozzy Score: 19  Interventions: increase time out of bed    Patient Safety:  Fall Score:  Total Score: 1  Interventions: gripper socks       Length of Stay:  Expected LOS: 2d 7h  Actual LOS: [facility] [place]

## 2022-12-17 NOTE — PROGRESS NOTES
Problem: Pain  Goal: *Control of Pain  Outcome: Progressing Towards Goal  Goal: *PALLIATIVE CARE:  Alleviation of Pain  Outcome: Progressing Towards Goal     Problem: Patient Education: Go to Patient Education Activity  Goal: Patient/Family Education  Outcome: Progressing Towards Goal     Problem: Falls - Risk of  Goal: *Absence of Falls  Description: Document Chano Blight Fall Risk and appropriate interventions in the flowsheet.   Outcome: Progressing Towards Goal  Note: Fall Risk Interventions:            Medication Interventions: Patient to call before getting OOB                   Problem: Patient Education: Go to Patient Education Activity  Goal: Patient/Family Education  Outcome: Progressing Towards Goal

## 2022-12-17 NOTE — PROGRESS NOTES
Problem: Pain  Goal: *Control of Pain  Outcome: Progressing Towards Goal     Problem: Falls - Risk of  Goal: *Absence of Falls  Description: Document Xiao Fall Risk and appropriate interventions in the flowsheet.   Outcome: Progressing Towards Goal  Note: Fall Risk Interventions:            Medication Interventions: Patient to call before getting OOB

## 2022-12-18 ENCOUNTER — APPOINTMENT (OUTPATIENT)
Dept: GENERAL RADIOLOGY | Age: 42
DRG: 390 | End: 2022-12-18
Attending: SURGERY
Payer: COMMERCIAL

## 2022-12-18 LAB
ANION GAP SERPL CALC-SCNC: 6 MMOL/L (ref 5–15)
BUN SERPL-MCNC: 16 MG/DL (ref 6–20)
BUN/CREAT SERPL: 24 (ref 12–20)
CALCIUM SERPL-MCNC: 8.2 MG/DL (ref 8.5–10.1)
CHLORIDE SERPL-SCNC: 112 MMOL/L (ref 97–108)
CO2 SERPL-SCNC: 25 MMOL/L (ref 21–32)
CREAT SERPL-MCNC: 0.67 MG/DL (ref 0.55–1.02)
ERYTHROCYTE [DISTWIDTH] IN BLOOD BY AUTOMATED COUNT: 12.4 % (ref 11.5–14.5)
GLUCOSE SERPL-MCNC: 84 MG/DL (ref 65–100)
HCT VFR BLD AUTO: 34.3 % (ref 35–47)
HGB BLD-MCNC: 10.9 G/DL (ref 11.5–16)
MAGNESIUM SERPL-MCNC: 1.6 MG/DL (ref 1.6–2.4)
MCH RBC QN AUTO: 29.8 PG (ref 26–34)
MCHC RBC AUTO-ENTMCNC: 31.8 G/DL (ref 30–36.5)
MCV RBC AUTO: 93.7 FL (ref 80–99)
NRBC # BLD: 0 K/UL (ref 0–0.01)
NRBC BLD-RTO: 0 PER 100 WBC
PHOSPHATE SERPL-MCNC: 3.5 MG/DL (ref 2.6–4.7)
PLATELET # BLD AUTO: 170 K/UL (ref 150–400)
PMV BLD AUTO: 8.8 FL (ref 8.9–12.9)
POTASSIUM SERPL-SCNC: 4 MMOL/L (ref 3.5–5.1)
RBC # BLD AUTO: 3.66 M/UL (ref 3.8–5.2)
SODIUM SERPL-SCNC: 143 MMOL/L (ref 136–145)
WBC # BLD AUTO: 7.1 K/UL (ref 3.6–11)

## 2022-12-18 PROCEDURE — 84100 ASSAY OF PHOSPHORUS: CPT

## 2022-12-18 PROCEDURE — 65270000029 HC RM PRIVATE

## 2022-12-18 PROCEDURE — 99231 SBSQ HOSP IP/OBS SF/LOW 25: CPT | Performed by: SURGERY

## 2022-12-18 PROCEDURE — 80048 BASIC METABOLIC PNL TOTAL CA: CPT

## 2022-12-18 PROCEDURE — 83735 ASSAY OF MAGNESIUM: CPT

## 2022-12-18 PROCEDURE — 74011250636 HC RX REV CODE- 250/636: Performed by: SURGERY

## 2022-12-18 PROCEDURE — 85027 COMPLETE CBC AUTOMATED: CPT

## 2022-12-18 PROCEDURE — 36415 COLL VENOUS BLD VENIPUNCTURE: CPT

## 2022-12-18 PROCEDURE — 74018 RADEX ABDOMEN 1 VIEW: CPT

## 2022-12-18 RX ADMIN — POTASSIUM CHLORIDE AND SODIUM CHLORIDE: 900; 150 INJECTION, SOLUTION INTRAVENOUS at 04:55

## 2022-12-18 NOTE — PROGRESS NOTES
End of Shift Note    Bedside shift change report given to Christie Thurman (oncoming nurse) by Yudelka Bradshaw RN (offgoing nurse). Report included the following information SBAR, MAR, and Recent Results    Shift worked:  2133-7833     Shift summary and any significant changes:     NGT to low suction. Denies pain and nausea. Ostomy now with some output. Pt noted ambulating multiple times. Stable on room air. IVF.    Concerns for physician to address:       Zone phone for oncoming shift:               Length of Stay:  Expected LOS: 2d 7h  Actual LOS: 1      Yudelka Bradshaw, RN

## 2022-12-18 NOTE — PROGRESS NOTES
Ms. Meeta Selby is alert and oriented. She has no complains of pain or discomfort today. She is up ad lid. Her  is at the bedside. She had an NG tube which was connected to continuous suction. MD clamped tube at 1030 and instructed to resume suction at 1430 for 15 minutes and DC if output<150. At 1445 her output was 75 and NG was removed. Patient has a clear liquid diet now, will monitor how well she tolerates her new diet.

## 2022-12-18 NOTE — PROGRESS NOTES
End of Shift Note    Bedside shift change report given to [address] (oncoming nurse) by Elly Carmona RN (offgoing nurse). Report included the following information SBAR, Kardex, Intake/Output, MAR, and Recent Results    Shift worked:  0610-3435     Shift summary and any significant changes:     Pt reports feeling significantly better, ostomy producing moderate/large amounts of unformed stool. Denies nausea and pain. Is hopeful to be able to advance diet today and possibly discharge within the coming days.       Concerns for physician to address:  none     Zone phone for oncoming shift:            Elly Carmona, RN

## 2022-12-18 NOTE — PROGRESS NOTES
Transition of Care Plan  RUR: 5%  DISPOSITION: The disposition plan is home with family assistance  F/U with PCP/Specialist    Transport: family         Reason for Admission:  intestinal obstruction                      RUR Score:      5%               Plan for utilizing home health:      not recommended     PCP: First and Last name:  Kimo Mendez MD     Name of Practice:    Are you a current patient: Yes/No:    Approximate date of last visit:    Can you participate in a virtual visit with your PCP:                     Current Advanced Directive/Advance Care Plan: Full Code      Healthcare Decision Maker:   Click here to complete [address] including selection of the Healthcare Decision Maker Relationship (ie \"Primary\")                             Transition of Care Plan:                      Patient lives with her  and is independent. Care Management Interventions  PCP Verified by CM: Yes  Palliative Care Criteria Met (RRAT>21 & CHF Dx)?: No  Mode of Transport at Discharge:  Other (see comment) ()  Transition of Care Consult (CM Consult): Discharge Planning  MyChart Signup: No  Discharge Durable Medical Equipment: No  Health Maintenance Reviewed: Yes  Physical Therapy Consult: No  Occupational Therapy Consult: No  Speech Therapy Consult: No  Support Systems: Spouse/Significant Other  Confirm Follow Up Transport: Self  [place] Resource Information Provided?: No  Discharge Location  Patient Expects to be Discharged to[de-identified] Home with family assistance    4:48 PM  NEREIDA Angeles

## 2022-12-18 NOTE — PROGRESS NOTES
SURGERY PROGRESS NOTE      Admit Date: 12/15/2022    POD * No surgery found *    Procedure: * No surgery found *      Subjective:     Patient feels better. Pain is essentially gone. She had some cramping before ostomy output but otherwise pain free. She has had ostomy output of both gas and stool. Objective:     Visit Vitals  BP (!) 111/59 (BP 1 Location: Right upper arm, BP Patient Position: Sitting)   Pulse 68   Temp 97.9 °F (36.6 °C)   Resp 16   Ht 5' 5\" (1.651 m)   Wt 63 kg (139 lb)   SpO2 96%   Breastfeeding No   BMI 23.13 kg/m²        Temp (24hrs), Av.3 °F (36.8 °C), Min:97.9 °F (36.6 °C), Max:98.6 °F (37 °C)      No intake/output data recorded.  1901 -  0700  In: 3237.5 [I.V.:3187.5]  Out: 1460     Physical Exam:    General:  alert, cooperative, no distress, appears stated age   Abdomen: soft, bowel sounds active, non-tender    Ostomy -  pink/patent/productive of stool and air.              Lab Results   Component Value Date/Time    WBC 7.1 2022 12:49 AM    HGB 10.9 (L) 2022 12:49 AM    HCT 34.3 (L) 2022 12:49 AM    PLATELET 307  12:49 AM    MCV 93.7 2022 12:49 AM     Lab Results   Component Value Date/Time    Creatinine 0.67 2022 12:49 AM    BUN 16 2022 12:49 AM    Sodium 143 2022 12:49 AM    Potassium 4.0 2022 12:49 AM    Chloride 112 (H) 2022 12:49 AM    CO2 25 2022 12:49 AM    Magnesium 1.6 2022 12:49 AM    Phosphorus 3.5 2022 12:49 AM       Assessment:     Active Problems:    Intestinal obstruction ([address] (2022)      Anxiety (2022)      History of rectal cancer (2022)      Colostomy in place [facility]) (2022)    SBO resolving     Plan:     Clamp trials of NG

## 2022-12-18 NOTE — PROGRESS NOTES
SURGERY PROGRESS NOTE      Admit Date: 12/15/2022      Subjective:     Patient complains of abdominal cramping and nausea after contrast.  Better now. Some flatus in colostomy bag but no stool. ,      Objective:     Visit Vitals  /64 (BP 1 Location: Right upper arm)   Pulse 65   Temp 98.4 °F (36.9 °C)   Resp 15   Ht 5' 5\" (1.651 m)   Wt 63 kg (139 lb)   SpO2 98%   Breastfeeding No   BMI 23.13 kg/m²        Temp (24hrs), Av.5 °F (36.9 °C), Min:98.4 °F (36.9 °C), Max:98.8 °F (37.1 °C)      No intake/output data recorded. 701 -  1900  In: 3237.5 [I.V.:3187.5]  Out: 750     Physical Exam:    General:  alert, cooperative, no distress, appears stated age   Abdomen: soft, midly distended, bowel sounds active, non-tender           Lab Results   Component Value Date/Time    WBC 5.8 2022 04:24 AM    HGB 12.2 2022 04:24 AM    HCT 38.3 2022 04:24 AM    PLATELET 323 10/27/5972 04:24 AM    MCV 93.9 2022 04:24 AM     Lab Results   Component Value Date/Time    Creatinine 0.67 2022 04:24 AM    BUN 9 2022 04:24 AM    Sodium 144 2022 04:24 AM    Potassium 4.1 2022 04:24 AM    Chloride 113 (H) 2022 04:24 AM    CO2 26 2022 04:24 AM     KUB -  contrast throughout dilated loops. Some contrast in right colon. Large stool burden in colon. Assessment:     Active Problems:    Intestinal obstruction ([address] (2022)      Anxiety (2022)      History of rectal cancer (2022)      Colostomy in place [facility]) (2022)    SBO - slowly resolving     Plan:       1) irrigated colostomy with tap water and mineral oil. Few small hard stool pellets out.       2) Continue NG decompression

## 2022-12-19 ENCOUNTER — APPOINTMENT (OUTPATIENT)
Dept: GENERAL RADIOLOGY | Age: 42
DRG: 390 | End: 2022-12-19
Attending: SURGERY
Payer: COMMERCIAL

## 2022-12-19 LAB
ANION GAP SERPL CALC-SCNC: 8 MMOL/L (ref 5–15)
BUN SERPL-MCNC: 10 MG/DL (ref 6–20)
BUN/CREAT SERPL: 17 (ref 12–20)
CALCIUM SERPL-MCNC: 8.2 MG/DL (ref 8.5–10.1)
CHLORIDE SERPL-SCNC: 108 MMOL/L (ref 97–108)
CO2 SERPL-SCNC: 25 MMOL/L (ref 21–32)
CREAT SERPL-MCNC: 0.59 MG/DL (ref 0.55–1.02)
ERYTHROCYTE [DISTWIDTH] IN BLOOD BY AUTOMATED COUNT: 11.9 % (ref 11.5–14.5)
GLUCOSE SERPL-MCNC: 77 MG/DL (ref 65–100)
HCT VFR BLD AUTO: 33.5 % (ref 35–47)
HGB BLD-MCNC: 10.7 G/DL (ref 11.5–16)
MAGNESIUM SERPL-MCNC: 1.6 MG/DL (ref 1.6–2.4)
MCH RBC QN AUTO: 29 PG (ref 26–34)
MCHC RBC AUTO-ENTMCNC: 31.9 G/DL (ref 30–36.5)
MCV RBC AUTO: 90.8 FL (ref 80–99)
NRBC # BLD: 0 K/UL (ref 0–0.01)
NRBC BLD-RTO: 0 PER 100 WBC
PHOSPHATE SERPL-MCNC: 3.9 MG/DL (ref 2.6–4.7)
PLATELET # BLD AUTO: 184 K/UL (ref 150–400)
PMV BLD AUTO: 9.1 FL (ref 8.9–12.9)
POTASSIUM SERPL-SCNC: 3.8 MMOL/L (ref 3.5–5.1)
RBC # BLD AUTO: 3.69 M/UL (ref 3.8–5.2)
SODIUM SERPL-SCNC: 141 MMOL/L (ref 136–145)
WBC # BLD AUTO: 4.3 K/UL (ref 3.6–11)

## 2022-12-19 PROCEDURE — 84100 ASSAY OF PHOSPHORUS: CPT

## 2022-12-19 PROCEDURE — 65270000029 HC RM PRIVATE

## 2022-12-19 PROCEDURE — 83735 ASSAY OF MAGNESIUM: CPT

## 2022-12-19 PROCEDURE — 80048 BASIC METABOLIC PNL TOTAL CA: CPT

## 2022-12-19 PROCEDURE — 36415 COLL VENOUS BLD VENIPUNCTURE: CPT

## 2022-12-19 PROCEDURE — 74011250637 HC RX REV CODE- 250/637: Performed by: SURGERY

## 2022-12-19 PROCEDURE — 85027 COMPLETE CBC AUTOMATED: CPT

## 2022-12-19 PROCEDURE — 74018 RADEX ABDOMEN 1 VIEW: CPT

## 2022-12-19 RX ORDER — POLYETHYLENE GLYCOL 3350 17 G/17G
17 POWDER, FOR SOLUTION ORAL DAILY
Status: DISCONTINUED | OUTPATIENT
Start: 2022-12-19 | End: 2022-12-20 | Stop reason: HOSPADM

## 2022-12-19 RX ORDER — ADHESIVE BANDAGE
30 BANDAGE TOPICAL DAILY PRN
Status: DISCONTINUED | OUTPATIENT
Start: 2022-12-19 | End: 2022-12-20 | Stop reason: HOSPADM

## 2022-12-19 RX ADMIN — MAGNESIUM HYDROXIDE 30 ML: 400 SUSPENSION ORAL at 21:01

## 2022-12-19 RX ADMIN — POLYETHYLENE GLYCOL 3350 17 G: 17 POWDER, FOR SOLUTION ORAL at 09:56

## 2022-12-19 NOTE — PROGRESS NOTES
End of Shift Note    Bedside shift change report given to Marquita starks (oncoming nurse) by Faye Acuna RN (offgoing nurse). Report included the following information SBAR, Kardex, Intake/Output, MAR, and Recent Results    Shift worked:  2245-9444     Shift summary and any significant changes:     No significant changes, pt reports feeling bloated with only gas output from colostomy. Denies pain/nausea. Ambulating well in hallway.  at bedside. No concerns.       Concerns for physician to address:  none     Zone phone for oncoming shift:              Faye Acuna, RN

## 2022-12-19 NOTE — PROGRESS NOTES
.End of Shift Note    Bedside shift change report given to MEDARDO Lozano (oncoming nurse) by Sara Damon LPN (offgoing nurse). Report included the following information SBAR, Kardex, ED Summary, Procedure Summary, Intake/Output, MAR, and Recent Results    Shift worked:  7am-7pm     Shift summary and any significant changes:     Continue plan of care. Patient has passed small amount of stool in colostomy. To receive MOM tonight. Patient ambulating in halls. Tolerating diet     Concerns for physician to address:  Plan of care     Zone phone for oncoming shift:   [phone]       Activity:  Activity Level: Up ad harshil  Number times ambulated in hallways past shift: 4  Number of times OOB to chair past shift: 1    Cardiac:   Cardiac Monitoring: No      Cardiac Rhythm: Sinus Rhythm    Access:  Current line(s): no access     Genitourinary:   Urinary status: voiding    Respiratory:   O2 Device: None (Room air)  Chronic home O2 use?: NO  Incentive spirometer at bedside: N/A       GI:  Last Bowel Movement Date: 12/17/22  Current diet:  ADULT DIET Regular; Vegetarian (Lacto-Ovo)  Passing flatus: YES  Tolerating current diet: YES       Pain Management:   Patient states pain is manageable on current regimen: YES    Skin:  Ozzy Score: 20  Interventions: increase time out of bed and nutritional support     Patient Safety:  Fall Score:  Total Score: 1  Interventions: gripper socks       Length of Stay:  Expected LOS: 2d 7h  Actual LOS: [address], LPN

## 2022-12-19 NOTE — PROGRESS NOTES
SURGERY PROGRESS NOTE      Admit Date: 12/15/2022    POD * No surgery found *    Procedure: * No surgery found *      Subjective:     Patient feels 'ok' this morning. She tolerated liquids and is having liquid stool and air out of the colostomy. She is concerned that she has not passed solid stool and feels it is stuck inside her. She is anxious about going how without passing more solid stool. Objective:     Visit Vitals  /63 (BP 1 Location: Right upper arm, BP Patient Position: Sitting)   Pulse (!) 56   Temp 98.1 °F (36.7 °C)   Resp 18   Ht 5' 5\" (1.651 m)   Wt 63 kg (139 lb)   SpO2 97%   Breastfeeding No   BMI 23.13 kg/m²        Temp (24hrs), Av.1 °F (36.7 °C), Min:97.9 °F (36.6 °C), Max:98.4 °F (36.9 °C)      No intake/output data recorded.  1901 -  0700  In: -   Out: 785     Physical Exam:    General:  alert, cooperative, no distress, appears stated age   Abdomen: soft, bowel sounds active, non-tender,  ostomy - pink/patent/productive. Large volume air and liquid stool           Lab Results   Component Value Date/Time    WBC 4.3 2022 03:25 AM    HGB 10.7 (L) 2022 03:25 AM    HCT 33.5 (L) 2022 03:25 AM    PLATELET 467  03:25 AM    MCV 90.8 2022 03:25 AM     Lab Results   Component Value Date/Time    Creatinine 0.59 2022 03:25 AM    BUN 10 2022 03:25 AM    Sodium 141 2022 03:25 AM    Potassium 3.8 2022 03:25 AM    Chloride 108 2022 03:25 AM    CO2 25 2022 03:25 AM    Magnesium 1.6 2022 03:25 AM    Phosphorus 3.9 2022 03:25 AM       Assessment:     Active Problems:    Intestinal obstruction ([address] (2022)      Anxiety (2022)      History of rectal cancer (2022)      Colostomy in place [facility]) (2022)    Bowel blockage resolved. Suspect it was fecal impaction and not an adhesive SBO.   Recommend daily bowel regiment to prevent recurrence    Plan:      Regular (vegan)  diet  Miralax Possible discharge later today

## 2022-12-20 VITALS
WEIGHT: 139 LBS | BODY MASS INDEX: 23.16 KG/M2 | DIASTOLIC BLOOD PRESSURE: 74 MMHG | HEART RATE: 65 BPM | HEIGHT: 65 IN | OXYGEN SATURATION: 98 % | TEMPERATURE: 97.9 F | RESPIRATION RATE: 16 BRPM | SYSTOLIC BLOOD PRESSURE: 117 MMHG

## 2022-12-20 PROCEDURE — 74011250637 HC RX REV CODE- 250/637: Performed by: SURGERY

## 2022-12-20 PROCEDURE — 99238 HOSP IP/OBS DSCHRG MGMT 30/<: CPT | Performed by: SURGERY

## 2022-12-20 RX ORDER — POLYETHYLENE GLYCOL 3350 17 G/17G
17 POWDER, FOR SOLUTION ORAL DAILY
Qty: 507 G | Refills: 2 | Status: SHIPPED | OUTPATIENT
Start: 2022-12-20

## 2022-12-20 RX ORDER — AMOXICILLIN 250 MG
1 CAPSULE ORAL
Qty: 60 TABLET | Refills: 0 | Status: SHIPPED | OUTPATIENT
Start: 2022-12-20

## 2022-12-20 RX ADMIN — POLYETHYLENE GLYCOL 3350 17 G: 17 POWDER, FOR SOLUTION ORAL at 08:47

## 2022-12-20 RX ADMIN — LACTULOSE 30 ML: 20 SOLUTION ORAL at 11:42

## 2022-12-20 NOTE — PROGRESS NOTES
1 Dr. Cristin Garrison paged because pt has questions about continuity of care and requesting to speak with him       0737 Follow up call placed to Dr. Cristin Garrison for call back

## 2022-12-20 NOTE — PROGRESS NOTES
.End of Shift Note    Bedside shift change report given to Indiana Henderson (oncoming nurse) by Antoine Cooper RN (offgoing nurse). Report included the following information SBAR, Kardex, ED Summary, Procedure Summary, Intake/Output, MAR, and Recent Results    Shift worked:  7 pm - 7 am     Shift summary and any significant changes:     Pt took MOM at 2100, passed small amount of liquid stool, pt reports feeling bloated and uncomfortable. Pt eager to try alternative therapies. Pt concerned about discharge, diet and fiber intake, and possible complications with travel plans from unresolved constipation. Orders for Dietary consult put in this morning. Pt ambulated the halls for approximately 1 hour over the shift. Pt refused morning lab draw. Concerns for physician to address:  Plan of care     Zone phone for oncoming shift:   [phone]       Activity:  Activity Level: Up ad harshil  Number times ambulated in hallways past shift: 4  Number of times OOB to chair past shift: 1    Cardiac:   Cardiac Monitoring: No      Cardiac Rhythm: Sinus Rhythm    Access:  Current line(s): no access     Genitourinary:   Urinary status: voiding    Respiratory:   O2 Device: None (Room air)  Chronic home O2 use?: NO  Incentive spirometer at bedside: N/A       GI:  Last Bowel Movement Date: 12/17/22  Current diet:  ADULT DIET Regular; Vegetarian (Lacto-Ovo)  Passing flatus: YES  Tolerating current diet: YES       Pain Management:   Patient states pain is manageable on current regimen: YES    Skin:  Ozzy Score: 21  Interventions: increase time out of bed and nutritional support     Patient Safety:  Fall Score:  Total Score: 1  Interventions: gripper socks       Length of Stay:  Expected LOS: 2d 7h  Actual LOS: [address], RN

## 2022-12-20 NOTE — PROGRESS NOTES
Problem: Pain  Goal: *Control of Pain  Outcome: Progressing Towards Goal  Goal: *PALLIATIVE CARE:  Alleviation of Pain  Outcome: Progressing Towards Goal     Problem: Patient Education: Go to Patient Education Activity  Goal: Patient/Family Education  Outcome: Progressing Towards Goal     Problem: Falls - Risk of  Goal: *Absence of Falls  Description: Document Nicolearavindsocorro Albrecht Fall Risk and appropriate interventions in the flowsheet.   Outcome: Progressing Towards Goal  Note: Fall Risk Interventions:            Medication Interventions: Teach patient to arise slowly, Patient to call before getting OOB                   Problem: Patient Education: Go to Patient Education Activity  Goal: Patient/Family Education  Outcome: Progressing Towards Goal

## 2022-12-20 NOTE — PROGRESS NOTES
Nutrition Note    Consult received, pt visited at bedside. Pt report's she was advised to follow a low fiber diet initially after her first surgery but developed an ileus after a few days. After this incident, her surgeon recommended pt eat her usual high fiber (vegan) diet as her body is used to a very high residue diet and felt that the low fiber diet was causing delayed transit. Pt reports this was working well for her from October until 4 days ago during which she developed fecal impaction and was admitted here. Impaction has been resolved and she is being discharged this afternoon. We talked through that her body is likely going to be 'happy' somewhere in the middle (fiber wise). Provided written education materials on foods most likely to cause stoma blockages and as she is >8 weeks out, trialing these back in her diet one at a time (this list also incidentally includes many of her favorite foods). Discussed keeping a food/ostomy diary and trying to connect slow downs in ostomy OP with what she consumed 8-12h prior. Together we developed a plan to consume a 'moderate' fiber diet (between low residue and her usual high fiber-vegan diet) while keeping her diary. Discussed foods to avoid should she notice ostomy OP thickening up too much and slowing down (banana, peanut butter, pasta, cheese, etc) as well as foods that can help should she become 'constipated' (like apple or prune juice). Pea protein based shakes should be fine as this is highly processed. Discussed reaching out to Outpatient RD on campus if she feels like trial of moderate fiber is not going well or if she desires some additional guidance. Provided my contact info as well should she have any questions in the mean time. Additionally provided her the name of Ostomy Dietitian who has online resources. Expected compliance is excellent, pt is extremely motivated and very health conscious.       Electronically signed by Marta Helms Daryle Margarita, [address], [address] [place]  on 12/20/2022 at 3:55 PM    Contact: ext [phone]

## 2022-12-20 NOTE — DISCHARGE INSTRUCTIONS
Please call [phone] to make a follow up appointment with Yumiko Vance or Kuldip Ott in 2 weeks     [facility]ist of [address] [facility], [address] [facility], [address] [facility]  Caitie Rudolph  [phone]  Fax [phone]

## 2022-12-20 NOTE — PROGRESS NOTES
PCP hospital follow-up transitional care appointment has been scheduled with ADRIANNE Rodriguez on 12/28/22 at 1615. Pending patient discharge.   Ana Rosa Pfeiffer, Care Management Assistant

## 2022-12-20 NOTE — DISCHARGE SUMMARY
DISCHARGE SUMMARY      Patient ID:  Francine Paz  [id number]  38 y.o.  1980    Admit date: 12/15/2022    Discharge date and time: No discharge date for patient encounter. Admitting Physician: Cristal Marinelli MD     Discharge Physician: Shavonne Calderon MD      Admission Diagnoses: Intestinal obstruction [facility]) [G83.797]    Discharge Diagnoses: Active Problems:    Intestinal obstruction ([address] (12/16/2022)      Anxiety (12/16/2022)      History of rectal cancer (12/16/2022)      Colostomy in place [facility]) (12/16/2022)        Procedures: * No surgery found *    Consults: general surgery        Hospital Course:          Patient admitted with SBO symptoms and CT findings. She was managed with bowel rest, IVF and NG decompression. She showed signs of return of bowel function within 24 hours. A gastrografin challenge show contrast progression to the Colon. Her NG was removed. She tolerated advancement of her diet. She did require irrigation of the colostomy to help with constipation relief. On discharge patient is without pain, tolerating her diet and ambulating. D/C Disposition: home     Patient Instructions:   Current Discharge Medication List        START taking these medications    Details   polyethylene glycol (MIRALAX) 17 gram/dose powder Take 17 g by mouth daily. Qty: 507 g, Refills: 2      linaCLOtide (Linzess) 145 mcg cap capsule Take 1 Capsule by mouth Daily (before breakfast). Qty: 30 Capsule, Refills: 0      senna-docusate (Senna Plus) 8.6-50 mg per tablet Take 1 Tablet by mouth nightly.   Qty: 60 Tablet, Refills: 0             Diet: low fiber      Activities: as tolerated     Follow-up with PCP in 2 weeks        Signed:  Shavonne Calderon MD  12/20/2022  3:00 PM

## 2022-12-20 NOTE — PROGRESS NOTES
Problem: Pain  Goal: *Control of Pain  Outcome: Progressing Towards Goal     Problem: Patient Education: Go to Patient Education Activity  Goal: Patient/Family Education  Outcome: Progressing Towards Goal     Problem: Falls - Risk of  Goal: *Absence of Falls  Description: Document Amira Stacy Fall Risk and appropriate interventions in the flowsheet.   Outcome: Progressing Towards Goal  Note: Fall Risk Interventions:            Medication Interventions: Teach patient to arise slowly, Patient to call before getting OOB                   Problem: Patient Education: Go to Patient Education Activity  Goal: Patient/Family Education  Outcome: Progressing Towards Goal

## 2022-12-23 ENCOUNTER — INPATIENT (INPATIENT)
Facility: HOSPITAL | Age: 42
LOS: 5 days | Discharge: HOME | DRG: 336 | End: 2022-12-29
Attending: SURGERY | Admitting: SURGERY
Payer: COMMERCIAL

## 2022-12-23 ENCOUNTER — TELEPHONE (OUTPATIENT)
Dept: SURGERY | Age: 42
End: 2022-12-23

## 2022-12-23 VITALS
HEART RATE: 69 BPM | RESPIRATION RATE: 17 BRPM | TEMPERATURE: 98 F | HEIGHT: 67 IN | WEIGHT: 139.99 LBS | DIASTOLIC BLOOD PRESSURE: 84 MMHG | OXYGEN SATURATION: 99 % | SYSTOLIC BLOOD PRESSURE: 138 MMHG

## 2022-12-23 DIAGNOSIS — Z98.890 OTHER SPECIFIED POSTPROCEDURAL STATES: Chronic | ICD-10-CM

## 2022-12-23 LAB
ALBUMIN SERPL ELPH-MCNC: 4.5 G/DL — SIGNIFICANT CHANGE UP (ref 3.3–5)
ALP SERPL-CCNC: 100 U/L — SIGNIFICANT CHANGE UP (ref 40–120)
ALT FLD-CCNC: 10 U/L — SIGNIFICANT CHANGE UP (ref 10–45)
ANION GAP SERPL CALC-SCNC: 13 MMOL/L — SIGNIFICANT CHANGE UP (ref 5–17)
ANISOCYTOSIS BLD QL: SLIGHT — SIGNIFICANT CHANGE UP
AST SERPL-CCNC: 19 U/L — SIGNIFICANT CHANGE UP (ref 10–40)
BASOPHILS # BLD AUTO: 0.11 K/UL — SIGNIFICANT CHANGE UP (ref 0–0.2)
BASOPHILS NFR BLD AUTO: 1.7 % — SIGNIFICANT CHANGE UP (ref 0–2)
BILIRUB SERPL-MCNC: 0.3 MG/DL — SIGNIFICANT CHANGE UP (ref 0.2–1.2)
BUN SERPL-MCNC: 13 MG/DL — SIGNIFICANT CHANGE UP (ref 7–23)
CALCIUM SERPL-MCNC: 9.4 MG/DL — SIGNIFICANT CHANGE UP (ref 8.4–10.5)
CHLORIDE SERPL-SCNC: 102 MMOL/L — SIGNIFICANT CHANGE UP (ref 96–108)
CO2 SERPL-SCNC: 25 MMOL/L — SIGNIFICANT CHANGE UP (ref 22–31)
CREAT SERPL-MCNC: 0.65 MG/DL — SIGNIFICANT CHANGE UP (ref 0.5–1.3)
EGFR: 111 ML/MIN/1.73M2 — SIGNIFICANT CHANGE UP
EOSINOPHIL # BLD AUTO: 0 K/UL — SIGNIFICANT CHANGE UP (ref 0–0.5)
EOSINOPHIL NFR BLD AUTO: 0 % — SIGNIFICANT CHANGE UP (ref 0–6)
GLUCOSE SERPL-MCNC: 117 MG/DL — HIGH (ref 70–99)
HCG SERPL-ACNC: 1 MIU/ML — SIGNIFICANT CHANGE UP
HCT VFR BLD CALC: 42.8 % — SIGNIFICANT CHANGE UP (ref 34.5–45)
HGB BLD-MCNC: 14.3 G/DL — SIGNIFICANT CHANGE UP (ref 11.5–15.5)
LIDOCAIN IGE QN: 101 U/L — HIGH (ref 7–60)
LYMPHOCYTES # BLD AUTO: 0.49 K/UL — LOW (ref 1–3.3)
LYMPHOCYTES # BLD AUTO: 7.8 % — LOW (ref 13–44)
MANUAL SMEAR VERIFICATION: SIGNIFICANT CHANGE UP
MCHC RBC-ENTMCNC: 29.4 PG — SIGNIFICANT CHANGE UP (ref 27–34)
MCHC RBC-ENTMCNC: 33.4 GM/DL — SIGNIFICANT CHANGE UP (ref 32–36)
MCV RBC AUTO: 87.9 FL — SIGNIFICANT CHANGE UP (ref 80–100)
MONOCYTES # BLD AUTO: 0.28 K/UL — SIGNIFICANT CHANGE UP (ref 0–0.9)
MONOCYTES NFR BLD AUTO: 4.4 % — SIGNIFICANT CHANGE UP (ref 2–14)
NEUTROPHILS # BLD AUTO: 5.43 K/UL — SIGNIFICANT CHANGE UP (ref 1.8–7.4)
NEUTROPHILS NFR BLD AUTO: 86.1 % — HIGH (ref 43–77)
OVALOCYTES BLD QL SMEAR: SLIGHT — SIGNIFICANT CHANGE UP
PLAT MORPH BLD: NORMAL — SIGNIFICANT CHANGE UP
PLATELET # BLD AUTO: 262 K/UL — SIGNIFICANT CHANGE UP (ref 150–400)
POIKILOCYTOSIS BLD QL AUTO: SLIGHT — SIGNIFICANT CHANGE UP
POLYCHROMASIA BLD QL SMEAR: SLIGHT — SIGNIFICANT CHANGE UP
POTASSIUM SERPL-MCNC: 3.6 MMOL/L — SIGNIFICANT CHANGE UP (ref 3.5–5.3)
POTASSIUM SERPL-SCNC: 3.6 MMOL/L — SIGNIFICANT CHANGE UP (ref 3.5–5.3)
PROT SERPL-MCNC: 7.8 G/DL — SIGNIFICANT CHANGE UP (ref 6–8.3)
RBC # BLD: 4.87 M/UL — SIGNIFICANT CHANGE UP (ref 3.8–5.2)
RBC # FLD: 12 % — SIGNIFICANT CHANGE UP (ref 10.3–14.5)
RBC BLD AUTO: ABNORMAL
SARS-COV-2 RNA SPEC QL NAA+PROBE: NEGATIVE — SIGNIFICANT CHANGE UP
SODIUM SERPL-SCNC: 140 MMOL/L — SIGNIFICANT CHANGE UP (ref 135–145)
WBC # BLD: 6.31 K/UL — SIGNIFICANT CHANGE UP (ref 3.8–10.5)
WBC # FLD AUTO: 6.31 K/UL — SIGNIFICANT CHANGE UP (ref 3.8–10.5)

## 2022-12-23 PROCEDURE — 74177 CT ABD & PELVIS W/CONTRAST: CPT | Mod: 26,MA

## 2022-12-23 PROCEDURE — 71045 X-RAY EXAM CHEST 1 VIEW: CPT | Mod: 26,76

## 2022-12-23 PROCEDURE — 99285 EMERGENCY DEPT VISIT HI MDM: CPT

## 2022-12-23 PROCEDURE — 74018 RADEX ABDOMEN 1 VIEW: CPT | Mod: 26

## 2022-12-23 RX ORDER — INFLUENZA VIRUS VACCINE 15; 15; 15; 15 UG/.5ML; UG/.5ML; UG/.5ML; UG/.5ML
0.5 SUSPENSION INTRAMUSCULAR ONCE
Refills: 0 | Status: DISCONTINUED | OUTPATIENT
Start: 2022-12-23 | End: 2022-12-29

## 2022-12-23 RX ORDER — ONDANSETRON 8 MG/1
4 TABLET, FILM COATED ORAL ONCE
Refills: 0 | Status: COMPLETED | OUTPATIENT
Start: 2022-12-23 | End: 2022-12-23

## 2022-12-23 RX ORDER — SODIUM CHLORIDE 9 MG/ML
1000 INJECTION, SOLUTION INTRAVENOUS ONCE
Refills: 0 | Status: COMPLETED | OUTPATIENT
Start: 2022-12-23 | End: 2022-12-23

## 2022-12-23 RX ORDER — METOCLOPRAMIDE HCL 10 MG
10 TABLET ORAL ONCE
Refills: 0 | Status: COMPLETED | OUTPATIENT
Start: 2022-12-23 | End: 2022-12-23

## 2022-12-23 RX ORDER — ACETAMINOPHEN 500 MG
1000 TABLET ORAL ONCE
Refills: 0 | Status: COMPLETED | OUTPATIENT
Start: 2022-12-23 | End: 2022-12-23

## 2022-12-23 RX ORDER — SODIUM CHLORIDE 9 MG/ML
1000 INJECTION, SOLUTION INTRAVENOUS
Refills: 0 | Status: DISCONTINUED | OUTPATIENT
Start: 2022-12-23 | End: 2022-12-27

## 2022-12-23 RX ORDER — ONDANSETRON 8 MG/1
4 TABLET, FILM COATED ORAL
Refills: 0 | Status: DISCONTINUED | OUTPATIENT
Start: 2022-12-23 | End: 2022-12-29

## 2022-12-23 RX ORDER — FAMOTIDINE 10 MG/ML
20 INJECTION INTRAVENOUS ONCE
Refills: 0 | Status: COMPLETED | OUTPATIENT
Start: 2022-12-23 | End: 2022-12-23

## 2022-12-23 RX ORDER — HYDROMORPHONE HYDROCHLORIDE 2 MG/ML
0.5 INJECTION INTRAMUSCULAR; INTRAVENOUS; SUBCUTANEOUS ONCE
Refills: 0 | Status: DISCONTINUED | OUTPATIENT
Start: 2022-12-23 | End: 2022-12-23

## 2022-12-23 RX ORDER — BENZOCAINE AND MENTHOL 5; 1 G/100ML; G/100ML
1 LIQUID ORAL
Refills: 0 | Status: DISCONTINUED | OUTPATIENT
Start: 2022-12-23 | End: 2022-12-29

## 2022-12-23 RX ORDER — BENZOCAINE 10 %
1 GEL (GRAM) MUCOUS MEMBRANE EVERY 6 HOURS
Refills: 0 | Status: DISCONTINUED | OUTPATIENT
Start: 2022-12-23 | End: 2022-12-24

## 2022-12-23 RX ORDER — HEPARIN SODIUM 5000 [USP'U]/ML
5000 INJECTION INTRAVENOUS; SUBCUTANEOUS EVERY 8 HOURS
Refills: 0 | Status: DISCONTINUED | OUTPATIENT
Start: 2022-12-23 | End: 2022-12-29

## 2022-12-23 RX ORDER — DIATRIZOATE MEGLUMINE 180 MG/ML
30 INJECTION, SOLUTION INTRAVESICAL ONCE
Refills: 0 | Status: COMPLETED | OUTPATIENT
Start: 2022-12-23 | End: 2022-12-23

## 2022-12-23 RX ADMIN — HYDROMORPHONE HYDROCHLORIDE 0.5 MILLIGRAM(S): 2 INJECTION INTRAMUSCULAR; INTRAVENOUS; SUBCUTANEOUS at 16:38

## 2022-12-23 RX ADMIN — SODIUM CHLORIDE 1000 MILLILITER(S): 9 INJECTION, SOLUTION INTRAVENOUS at 13:01

## 2022-12-23 RX ADMIN — FAMOTIDINE 20 MILLIGRAM(S): 10 INJECTION INTRAVENOUS at 13:00

## 2022-12-23 RX ADMIN — HYDROMORPHONE HYDROCHLORIDE 0.5 MILLIGRAM(S): 2 INJECTION INTRAMUSCULAR; INTRAVENOUS; SUBCUTANEOUS at 13:31

## 2022-12-23 RX ADMIN — DIATRIZOATE MEGLUMINE 30 MILLILITER(S): 180 INJECTION, SOLUTION INTRAVESICAL at 13:33

## 2022-12-23 RX ADMIN — SODIUM CHLORIDE 110 MILLILITER(S): 9 INJECTION, SOLUTION INTRAVENOUS at 17:59

## 2022-12-23 RX ADMIN — ONDANSETRON 4 MILLIGRAM(S): 8 TABLET, FILM COATED ORAL at 13:00

## 2022-12-23 RX ADMIN — Medication 400 MILLIGRAM(S): at 18:00

## 2022-12-23 RX ADMIN — Medication 104 MILLIGRAM(S): at 13:33

## 2022-12-23 RX ADMIN — HEPARIN SODIUM 5000 UNIT(S): 5000 INJECTION INTRAVENOUS; SUBCUTANEOUS at 22:53

## 2022-12-23 RX ADMIN — BENZOCAINE AND MENTHOL 1 LOZENGE: 5; 1 LIQUID ORAL at 21:21

## 2022-12-23 NOTE — H&P ADULT - ASSESSMENT
41yo Female pt with PMHx Rectal cancer (Stage IIIC) s/p neoadjuvant chemorads (4 doses of chemotherapy last one 08/19; 27 radiation sessions); PSHx APR (09/2022 @ [facility]). Presented to [facility] with c/o abdominal pain for 1 day. She indicates that she was doing well, until this am, when she started developing crampy abdominal pain around her whole abdomen, nonlocalized. Pain was a/w nausea, bilious emesis. She also noted no output from her ostomy for the past 24 hours. Of note she was recently admitted (last month) at an OSH in [place] with the same symptoms, and was told that she had a SBO, managed nonop with NGT decompression, and was subsequently discharged with adequate bowel function and tolerating a regular diet, and was doing well until this am. Denies fever, chills, SOB, blood per ostomy. Last colonoscopy - in february when dx with rectal cancer / Serial flexible sigmoidoscopies since diagnosis for surgical planning / EGD - never. Maternal and paternal grandmothers dx with colon cancer; paternal great grandfather dx with gastric cancer. Surgery consulted for SBO. In the ED, pt afebrile, nontachycardic, normotensive, and satting on RA. Labs WBC 6.3, H/H 14.3/42.8, , rest of chemisty wnl. CT A/P with findings consistent with Distal small bowel obstruction with transition point in the left mid abdomen adjacent to the narrowed distal segment of the descending colon just proximal to the colostomy. An NGT was placed with immediate return of 500cc of clear fluid with food content, patient feeling better after placement.    Plan:    -Admit to regional under Dr. Peralta  -NPO  -IVF  -NGT  -Monitor for return of bowel function  -Serial abdominal exams  -SQH, SCDs  -Encourage OOB, ambulation  -Am labs         43yo Female pt with PMHx Rectal cancer (Stage IIIC) s/p neoadjuvant chemorads (4 doses of chemotherapy last one 08/19; 27 radiation sessions); PSHx APR (09/2022 @ [facility]). Presented to [facility] with c/o abdominal pain for 1 day. She indicates that she was doing well, until this am, when she started developing crampy abdominal pain around her whole abdomen, nonlocalized. Pain was a/w nausea, bilious emesis. She also noted no output from her ostomy for the past 24 hours. Of note she was recently admitted (last month) at an OSH in [place] with the same symptoms, and was told that she had a SBO, managed nonop with NGT decompression, and was subsequently discharged with adequate bowel function and tolerating a regular diet, and was doing well until this am. Denies fever, chills, SOB, blood per ostomy. Last colonoscopy - in february when dx with rectal cancer / Serial flexible sigmoidoscopies since diagnosis for surgical planning / EGD - never. Maternal and paternal grandmothers dx with colon cancer; paternal great grandfather dx with gastric cancer. Surgery consulted for SBO. In the ED, pt afebrile, nontachycardic, normotensive, and satting on RA. Labs WBC 6.3, H/H 14.3/42.8, , rest of chemisty wnl. CT A/P with findings consistent with Distal small bowel obstruction with transition point in the left mid abdomen adjacent to the narrowed distal segment of the descending colon just proximal to the colostomy. An NGT was placed with immediate return of 500cc of clear fluid with food content, patient feeling better after placement.    Plan:    -Admit to regional under Dr. Peralta  -NPO  -IVF  -NGT  -Monitor for return of bowel function  -Serial abdominal exams  -SQH, SCDs  -Encourage OOB, ambulation  -Am labs    CHIEF RESIDENT ADDENDUM  Agree with above. Delayed note while in the OR; consult resident discussed with attending surgeon directly. 42F with rectal cancer s/p APR 9/2022 a/w nausea, vomiting x 1 day. VSS, exam with NGT in place with bilious output, abdomen softly distended, nontender. CT with dilated loops of small bowel with a transition point. SBO. Unlikely bowel ischemia. NGT in place. Serial abdominal exams. IVF as needed.

## 2022-12-23 NOTE — TELEPHONE ENCOUNTER
Spoke with patient and , patient complain of not feeling well, was admitted 12/15/22 with SBO and discharged from hospital 12/20/22. Presently in [place] with complaints of just not feeling well, abdominal pain, nausea and vomit. Encouraged patient to go to an ER or urgent care to be evaluated. Express understanding.

## 2022-12-23 NOTE — H&P ADULT - NSHPLABSRESULTS_GEN_ALL_CORE
.  LABS:                         14.3   6.31  )-----------( 262      ( 23 Dec 2022 12:29 )             42.8     12-23    140  |  102  |  13  ----------------------------<  117<H>  3.6   |  25  |  0.65    Ca    9.4      23 Dec 2022 12:29  Phos  2.6     12-23    TPro  7.8  /  Alb  4.5  /  TBili  0.3  /  DBili  x   /  AST  19  /  ALT  10  /  AlkPhos  100  12-23              RADIOLOGY, EKG & ADDITIONAL TESTS: Reviewed.

## 2022-12-23 NOTE — ED PROVIDER NOTE - OBJECTIVE STATEMENT
41 yo F with pmh of rectal ca s/p chemo/XRT, colostomy on 9/26 c/o upper abd pain, n/v since this morning. Pt was admitted last week until 2 days ago in [place] for a ?SBO. Pt was feeling better when she was discharged. This morning noticed there was no output in her ostomy and she was not passing gas and then started to develop abd pain. Pain similar to her pain last week. Associated with n/v. Denies fever, chills, cp, sob, dysuria.

## 2022-12-23 NOTE — PATIENT PROFILE ADULT - FALL HARM RISK - UNIVERSAL INTERVENTIONS
Bed in lowest position, wheels locked, appropriate side rails in place/Call bell, personal items and telephone in reach/Instruct patient to call for assistance before getting out of bed or chair/Non-slip footwear when patient is out of bed/[place] to call system/Physically safe environment - no spills, clutter or unnecessary equipment/Purposeful Proactive Rounding/Room/bathroom lighting operational, light cord in reach

## 2022-12-23 NOTE — ED PROVIDER NOTE - PHYSICAL EXAMINATION
CONSTITUTIONAL: uncomfortable appearing, actively vomiting   HEAD: Normocephalic; atraumatic.   EYES: PERRL; EOM intact; conjunctiva and sclera clear  ENT: normal nose; no rhinorrhea; normal pharynx with no erythema or lesions.   NECK: Supple; non-tender; no LAD  CARDIOVASCULAR: Normal S1, S2; No audible murmurs. Regular rate and rhythm.   RESPIRATORY: Breathing easily; breath sounds clear and equal bilaterally; no wheezes, rhonchi, or rales.  GI: Soft; +generalized abd tenderness, no output in ostomy   MSK: FROM at all extremities, normal tone   EXT: No cyanosis or edema; N/V intact  SKIN: Normal for age and race; warm; dry; good turgor; no apparent lesions or rash.   NEURO: A & O x 3; face symmetric; grossly unremarkable.   PSYCHOLOGICAL: The patient’s mood and manner are appropriate.

## 2022-12-23 NOTE — ED ADULT NURSE NOTE - NSIMPLEMENTINTERV_GEN_ALL_ED
Implemented All Fall with Harm Risk Interventions:  [place] to call system. Call bell, personal items and telephone within reach. Instruct patient to call for assistance. Room bathroom lighting operational. Non-slip footwear when patient is off stretcher. Physically safe environment: no spills, clutter or unnecessary equipment. Stretcher in lowest position, wheels locked, appropriate side rails in place. Provide visual cue, wrist band, yellow gown, etc. Monitor gait and stability. Monitor for mental status changes and reorient to person, place, and time. Review medications for side effects contributing to fall risk. Reinforce activity limits and safety measures with patient and family. Provide visual clues: red socks.

## 2022-12-23 NOTE — PATIENT PROFILE ADULT - NSPROSPHOSPCHAPLAINYN_GEN_A_NUR
"Patient identifiers confirmed with Layo Haney.     Patient reports "I think I feel." Reports pain to left side. Denies trauma to head, SOB, N/V, numbness/tingling, chest pain. Reports "my nurse takes care of me."    LOC: The patient is awake, alert and aware of environment with an appropriate affect, the patient is oriented to self, confused to time, place, situation.  APPEARANCE: Patient resting comfortably and in no acute distress.  SKIN: The skin is warm and dry, color consistent with ethnicity, patient has normal skin turgor and moist mucus membranes, generalized scabs to body, Band-Aid to rt arm; patient reports "the nurse put that on me." No bleeding or drainage from site noted at this time.   MUSCULOSKELETAL: Patient moving all extremities well, no obvious swelling or deformities noted.   RESPIRATORY: Airway is open and patent; respirations are spontaneous, patient has a normal effort and rate, no accessory muscle use noted.   CARDIAC: Patient has no peripheral edema noted, capillary refill < 3 seconds.   ABDOMEN: Soft and non tender to palpation, no distention noted.  NEUROLOGIC: PERRL, eyes open spontaneously, behavior appropriate to situation, follows commands, facial expression symmetrical, bilateral hand grasp equal and even.      " no

## 2022-12-23 NOTE — H&P ADULT - NSHPPHYSICALEXAM_GEN_ALL_CORE
Physical Exam    General: NAD, laying comfortably in bed  Neuro: AAOx3, no deficits  Cardio: S1,S2, RRR  Pulm: Lungs bilaterally clear to auscultation  Abdomen: Soft, NT, mildly distended, ostomy without gas or stool noted  Extremities: WWP

## 2022-12-23 NOTE — PATIENT PROFILE ADULT - FUNCTIONAL ASSESSMENT - DAILY ACTIVITY 4.
[FreeTextEntry1] : The pre and postoperative images were reviewed and discussed with the patient and her .  Wound care and activity levels were discussed.  It was recommended that the patient avoid bending and lifting.  It was recommended that the patient have a metabolic panel, urine osmolality and serum osmolality drawn today after the appointment at a [facility] lab.  Dr. Marvin Evans's office was called and a message was left to return the call to discuss the patient's lab results.  Dr. Murillo was also made aware.  The patient is to continue her follow up with Dr. Evans and Dr. Marie and is to return to this office in 3 weeks for evaluation.  It is planned that she will return in 3 months with a new MRI of the brain with and without contrast for review. 4 = No assist / stand by assistance

## 2022-12-23 NOTE — ED PROVIDER NOTE - CLINICAL SUMMARY MEDICAL DECISION MAKING FREE TEXT BOX
41 yo F with pmh of rectal ca s/p chemo/XRT, colostomy on 9/26 c/o upper abd pain, n/v since this morning. Pt was admitted last week until 2 days ago in [place] for a ?SBO. Pt was feeling better when she was discharged. This morning noticed there was no output in her ostomy and she was not passing gas and then started to develop abd pain. Pain similar to her pain last week. Associated with n/v. Denies fever, chills, cp, sob, dysuria. Afebrile, abd soft; +generalized abd tenderness, no output in ostomy. Labs, CT, pain control, antiemetics r/o SBO

## 2022-12-23 NOTE — ED ADULT NURSE NOTE - CHIEF COMPLAINT QUOTE
Pt BIBEMS c/o nausea vomiting epigastric pain. Hx of rectal CA and colostomy creation in september. pt recently hospital due to obstruction. Pt endorses minimal output in ostomy with no flatus in bag. Pt appears uncomfortable. Receives care at [facility].

## 2022-12-23 NOTE — H&P ADULT - ATTENDING COMMENTS
Patient seen and examined at bedside on 12/24/2022.  Patient stated she was discharged 12/20 from the hospital in [place] where she was managed non-operatively for SBO.  Reports had some soft stool in colostomy bag, but no gas yet.  Feeling better since admission.    Abdomen soft, mildly distended, no significant tenderness.    Labs, CT reviewed.    -Will get AXR to evaluate contrast progression  -NPO, NGT

## 2022-12-23 NOTE — ED ADULT TRIAGE NOTE - CHIEF COMPLAINT QUOTE
Pt BIBEMS c/o nausea vomiting epigastric pain. Hx of rectal CA and colostomy creation in september. pt recently hospital due to obstruction. Pt endorses minimal output in ostomy with no flatus in bag. Pt appears uncomfortable. Receives care at [facility].

## 2022-12-23 NOTE — PATIENT PROFILE ADULT - FUNCTIONAL ASSESSMENT - BASIC MOBILITY 1.
To BR Voided Resp less labored. Pt received from RN Debi. Pt oriented to CDU & plan of care was discussed. Pt denies any SOB, KRAUS, or chest pain or palpitations at the moment. As per pt son, pt was falling asleep a few minutes ago and pt woke up suddenly and verbalized feeling unable to catch her breath and anxious. Pt denies any chest pain or palpitations at that time. Pt aware to notify RN of any further symptoms. Safety & comfort measures maintained. Call bell in reach. Will continue to monitor. 4 = No assist / stand by assistance

## 2022-12-23 NOTE — H&P ADULT - HISTORY OF PRESENT ILLNESS
43yo Female pt with PMHx Rectal cancer (Stage IIIC) s/p neoadjuvant chemorads (4 doses of chemotherapy last one 08/19; 27 radiation sessions); PSHx APR (09/2022 @ [facility]). Presented to [facility] with c/o abdominal pain for 1 day. She indicates that she was doing well, until this am, when she started developing crampy abdominal pain around her whole abdomen, nonlocalized. Pain was a/w nausea, bilious emesis. She also noted no output from her ostomy for the past 24 hours. Of note she was recently admitted (last month) at an OSH in [place] with the same symptoms, and was told that she had a SBO, managed nonop with NGT decompression, and was subsequently discharged with adequate bowel function and tolerating a regular diet, and was doing well until this am. Denies fever, chills, SOB, blood per ostomy.    Last colonoscopy - in february when dx with rectal cancer / Serial flexible sigmoidoscopies since diagnosis for surgical planning / EGD - never  Maternal and paternal grandmothers dx with colon cancer; paternal great grandfather dx with gastric cancer.    Surgery consulted for SBO.    In the ED, pt afebrile, nontachycardic, normotensive, and satting on RA. Labs WBC 6.3, H/H 14.3/42.8, , rest of chemisty wnl. CT A/P with findings consistent with Distal small bowel obstruction with transition point in the left mid abdomen adjacent to the narrowed distal segment of the descending colon just proximal to the colostomy. An NGT was placed with immediate return of 500cc of clear fluid with food content, patient feeling better after placement.    LABS:                        14.3   6.31  )-----------( 262      ( 23 Dec 2022 12:29 )             42.8     12-23    140  |  102  |  13  ----------------------------<  117<H>  3.6   |  25  |  0.65    Ca    9.4      23 Dec 2022 12:29  Phos  2.6     12-23    TPro  7.8  /  Alb  4.5  /  TBili  0.3  /  DBili  x   /  AST  19  /  ALT  10  /  AlkPhos  100  12-23      PMH: Stage IIIC rectal cancer  PSx: APR (09/2022)  Social Hx: Social smoker when teenager 1-2 cigarettes at parties; social alcohol intake  Family Hx: Maternal and paternal grandmothers dx with colon cancer; paternal great grandfather dx with gastric cancer    Meds: Calcium, vitamin D    Physical Exam    General: NAD, laying comfortably in bed  Neuro: AAOx3, no deficits  Cardio: S1,S2, RRR  Pulm: Lungs bilaterally clear to auscultation  Abdomen: Soft, NT, mildly distended, ostomy without gas or stool noted  Extremities: WWP

## 2022-12-23 NOTE — ED ADULT NURSE NOTE - OBJECTIVE STATEMENT
Patient a+o x4 c/o epigastric pain with n/v. Patient received moaning and guarding, cooperative, colostomy placed in September, chemo port accessed monthly. Hx rectal ca. Patient last urinated before coming to ED, flatus last night. IV initiated, labs collected and sent, medication administered. Physician at bedside.

## 2022-12-24 LAB
ANION GAP SERPL CALC-SCNC: 8 MMOL/L — SIGNIFICANT CHANGE UP (ref 5–17)
APTT BLD: 27.4 SEC — LOW (ref 27.5–35.5)
BASOPHILS # BLD AUTO: 0.05 K/UL — SIGNIFICANT CHANGE UP (ref 0–0.2)
BASOPHILS NFR BLD AUTO: 1.1 % — SIGNIFICANT CHANGE UP (ref 0–2)
BUN SERPL-MCNC: 12 MG/DL — SIGNIFICANT CHANGE UP (ref 7–23)
CALCIUM SERPL-MCNC: 8.8 MG/DL — SIGNIFICANT CHANGE UP (ref 8.4–10.5)
CHLORIDE SERPL-SCNC: 105 MMOL/L — SIGNIFICANT CHANGE UP (ref 96–108)
CO2 SERPL-SCNC: 29 MMOL/L — SIGNIFICANT CHANGE UP (ref 22–31)
CREAT SERPL-MCNC: 0.73 MG/DL — SIGNIFICANT CHANGE UP (ref 0.5–1.3)
EGFR: 104 ML/MIN/1.73M2 — SIGNIFICANT CHANGE UP
EOSINOPHIL # BLD AUTO: 0.12 K/UL — SIGNIFICANT CHANGE UP (ref 0–0.5)
EOSINOPHIL NFR BLD AUTO: 2.7 % — SIGNIFICANT CHANGE UP (ref 0–6)
GLUCOSE SERPL-MCNC: 97 MG/DL — SIGNIFICANT CHANGE UP (ref 70–99)
HCT VFR BLD CALC: 37.1 % — SIGNIFICANT CHANGE UP (ref 34.5–45)
HGB BLD-MCNC: 12.2 G/DL — SIGNIFICANT CHANGE UP (ref 11.5–15.5)
IMM GRANULOCYTES NFR BLD AUTO: 0.2 % — SIGNIFICANT CHANGE UP (ref 0–0.9)
INR BLD: 1.2 — HIGH (ref 0.88–1.16)
LYMPHOCYTES # BLD AUTO: 0.41 K/UL — LOW (ref 1–3.3)
LYMPHOCYTES # BLD AUTO: 9.1 % — LOW (ref 13–44)
MAGNESIUM SERPL-MCNC: 2.1 MG/DL — SIGNIFICANT CHANGE UP (ref 1.6–2.6)
MCHC RBC-ENTMCNC: 29.5 PG — SIGNIFICANT CHANGE UP (ref 27–34)
MCHC RBC-ENTMCNC: 32.9 GM/DL — SIGNIFICANT CHANGE UP (ref 32–36)
MCV RBC AUTO: 89.8 FL — SIGNIFICANT CHANGE UP (ref 80–100)
MONOCYTES # BLD AUTO: 0.42 K/UL — SIGNIFICANT CHANGE UP (ref 0–0.9)
MONOCYTES NFR BLD AUTO: 9.4 % — SIGNIFICANT CHANGE UP (ref 2–14)
NEUTROPHILS # BLD AUTO: 3.48 K/UL — SIGNIFICANT CHANGE UP (ref 1.8–7.4)
NEUTROPHILS NFR BLD AUTO: 77.5 % — HIGH (ref 43–77)
NRBC # BLD: 0 /100 WBCS — SIGNIFICANT CHANGE UP (ref 0–0)
PHOSPHATE SERPL-MCNC: 4.3 MG/DL — SIGNIFICANT CHANGE UP (ref 2.5–4.5)
PLATELET # BLD AUTO: 229 K/UL — SIGNIFICANT CHANGE UP (ref 150–400)
POTASSIUM SERPL-MCNC: 3.7 MMOL/L — SIGNIFICANT CHANGE UP (ref 3.5–5.3)
POTASSIUM SERPL-SCNC: 3.7 MMOL/L — SIGNIFICANT CHANGE UP (ref 3.5–5.3)
PROTHROM AB SERPL-ACNC: 14.3 SEC — HIGH (ref 10.5–13.4)
RBC # BLD: 4.13 M/UL — SIGNIFICANT CHANGE UP (ref 3.8–5.2)
RBC # FLD: 12.4 % — SIGNIFICANT CHANGE UP (ref 10.3–14.5)
SODIUM SERPL-SCNC: 142 MMOL/L — SIGNIFICANT CHANGE UP (ref 135–145)
WBC # BLD: 4.49 K/UL — SIGNIFICANT CHANGE UP (ref 3.8–10.5)
WBC # FLD AUTO: 4.49 K/UL — SIGNIFICANT CHANGE UP (ref 3.8–10.5)

## 2022-12-24 PROCEDURE — 99222 1ST HOSP IP/OBS MODERATE 55: CPT | Mod: GC

## 2022-12-24 PROCEDURE — 99222 1ST HOSP IP/OBS MODERATE 55: CPT

## 2022-12-24 PROCEDURE — 74019 RADEX ABDOMEN 2 VIEWS: CPT | Mod: 26

## 2022-12-24 PROCEDURE — 71045 X-RAY EXAM CHEST 1 VIEW: CPT | Mod: 26

## 2022-12-24 RX ORDER — ACETAMINOPHEN 500 MG
1000 TABLET ORAL ONCE
Refills: 0 | Status: COMPLETED | OUTPATIENT
Start: 2022-12-24 | End: 2022-12-24

## 2022-12-24 RX ORDER — POTASSIUM CHLORIDE 20 MEQ
10 PACKET (EA) ORAL
Refills: 0 | Status: COMPLETED | OUTPATIENT
Start: 2022-12-24 | End: 2022-12-24

## 2022-12-24 RX ORDER — BENZOCAINE 10 %
1 GEL (GRAM) MUCOUS MEMBRANE ONCE
Refills: 0 | Status: DISCONTINUED | OUTPATIENT
Start: 2022-12-24 | End: 2022-12-29

## 2022-12-24 RX ADMIN — HEPARIN SODIUM 5000 UNIT(S): 5000 INJECTION INTRAVENOUS; SUBCUTANEOUS at 06:34

## 2022-12-24 RX ADMIN — Medication 400 MILLIGRAM(S): at 23:47

## 2022-12-24 RX ADMIN — Medication 400 MILLIGRAM(S): at 13:28

## 2022-12-24 RX ADMIN — SODIUM CHLORIDE 110 MILLILITER(S): 9 INJECTION, SOLUTION INTRAVENOUS at 23:48

## 2022-12-24 RX ADMIN — Medication 100 MILLIEQUIVALENT(S): at 10:21

## 2022-12-24 RX ADMIN — Medication 100 MILLIEQUIVALENT(S): at 08:58

## 2022-12-24 RX ADMIN — Medication 1000 MILLIGRAM(S): at 14:00

## 2022-12-24 RX ADMIN — SODIUM CHLORIDE 110 MILLILITER(S): 9 INJECTION, SOLUTION INTRAVENOUS at 21:11

## 2022-12-24 RX ADMIN — SODIUM CHLORIDE 110 MILLILITER(S): 9 INJECTION, SOLUTION INTRAVENOUS at 13:27

## 2022-12-24 RX ADMIN — HEPARIN SODIUM 5000 UNIT(S): 5000 INJECTION INTRAVENOUS; SUBCUTANEOUS at 13:38

## 2022-12-24 RX ADMIN — HEPARIN SODIUM 5000 UNIT(S): 5000 INJECTION INTRAVENOUS; SUBCUTANEOUS at 21:10

## 2022-12-24 NOTE — CONSULT NOTE ADULT - SUBJECTIVE AND OBJECTIVE BOX
Patient seen and evaluated at bedside    HPI:  41yo Female pt with PMHx rectal ca (s/p chemo/XRT, last chemo session 8/19; colostomy on 9/26) presented to [facility] with c/o abdominal pain, n/v, reduced ostomy output for 1 day found with SBO (CT A/P with findings consistent with distal small bowel obstruction with transition point in the left mid abdomen adjacent to the narrowed distal segment of the descending colon just proximal to the colostomy). NGT placed for decompression.    PMHx:   Rectal cancer    PSHx:   History of rectal surgery    FAMILY HISTORY:    Allergies:  latex (Unknown)  morphine (Unknown)    Home Medications:    Current Medications:   benzocaine 15 mG/menthol 3.6 mG Lozenge 1 Lozenge Oral every 2 hours PRN  benzocaine 20% Spray 1 Spray(s) Topical every 6 hours PRN  heparin   Injectable 5000 Unit(s) SubCutaneous every 8 hours  influenza   Vaccine 0.5 milliLiter(s) IntraMuscular once  lactated ringers. 1000 milliLiter(s) IV Continuous <Continuous>  ondansetron Injectable 4 milliGRAM(s) IV Push every 3 hours PRN      REVIEW OF SYSTEMS:  Constitutional:     [X] negative [ ] fevers [ ] chills [ ] weight loss [ ] weight gain  HEENT:                  [X] negative [ ] dry eyes [ ] eye irritation [ ] postnasal drip [ ] nasal congestion  CV:                         [X] negative  [ ] chest pain [ ] orthopnea [ ] palpitations [ ] murmur  Resp:                     [X] negative [ ] cough [ ] shortness of breath [ ] wheezing [ ] sputum [ ] hemoptysis  GI:                          [X] negative [ ] nausea [ ] vomiting [ ] diarrhea [ ] constipation [ ] abd pain [ ] dysphagia   :                        [X] negative [ ] dysuria [ ] nocturia [ ] hematuria [ ] increased urinary frequency  MSK:                      [X] negative [ ] back pain [ ] myalgias [ ] arthralgias [ ] fracture  Skin:                       [X] negative [ ] rash [ ] itch  Neuro:                   [X] negative [ ] headache [ ] dizziness [ ] syncope [ ] weakness [ ] numbness  Psych:                    [X] negative [ ] anxiety [ ] depression  Endo:                     [X] negative [ ] diabetes [ ] thyroid problem  Heme/Lymph:      [X] negative [ ] anemia [ ] bleeding problem  Allergic/Immune: [X] negative [ ] itchy eyes [ ] nasal discharge [ ] hives [ ] angioedema    [X] All other systems negative or otherwise described above.  [ ] Unable to assess ROS due to ________.    ICU Vital Signs Last 24 Hrs  T(C): 36.8 (24 Dec 2022 13:11), Max: 36.8 (23 Dec 2022 20:40)  T(F): 98.3 (24 Dec 2022 13:11), Max: 98.3 (24 Dec 2022 13:11)  HR: 72 (24 Dec 2022 13:11) (55 - 72)  BP: 115/70 (24 Dec 2022 13:11) (115/70 - 136/79)  BP(mean): --  ABP: --  ABP(mean): --  RR: 19 (24 Dec 2022 13:11) (16 - 20)  SpO2: 98% (24 Dec 2022 13:11) (96% - 100%)    O2 Parameters below as of 24 Dec 2022 13:11  Patient On (Oxygen Delivery Method): room air          Orthostatic VS    Daily     Daily   I&O's Summary    23 Dec 2022 07:01  -  24 Dec 2022 07:00  --------------------------------------------------------  IN: 1210 mL / OUT: 1300 mL / NET: -90 mL    24 Dec 2022 07:01  -  24 Dec 2022 17:04  --------------------------------------------------------  IN: 1070 mL / OUT: 1051 mL / NET: 19 mL        Physical Exam:  GENERAL: No acute distress, well-developed  HEAD:  Atraumatic, Normocephalic  ENT: EOMI, conjunctiva and sclera clear, Neck supple, No JVD, moist mucosa  CHEST/LUNG: Clear to auscultation bilaterally; No wheeze, equal breath sounds bilaterally   BACK: No spinal tenderness  HEART: Regular rate and rhythm; No murmurs, rubs, or gallops, radial and DP 2+ b/l, euvolemic  ABDOMEN: Soft, Nontender, Nondistended, LLQ colostomy in place  EXTREMITIES:  No clubbing, cyanosis, or edema  NEUROLOGY: AAOx3, non-focal  SKIN: Normal color, No rashes or lesions    LABS:                        12.2   4.49  )-----------( 229      ( 24 Dec 2022 07:01 )             37.1     PT/INR - ( 24 Dec 2022 07:01 )   PT: 14.3 sec;   INR: 1.20          PTT - ( 24 Dec 2022 07:01 )  PTT:27.4 sec  12-24    142  |  105  |  12  ----------------------------<  97  3.7   |  29  |  0.73    Ca    8.8      24 Dec 2022 07:01  Phos  4.3     12-24  Mg     2.1     12-24    TPro  7.8  /  Alb  4.5  /  TBili  0.3  /  DBili  x   /  AST  19  /  ALT  10  /  AlkPhos  100  12-23        RADIOLOGY & ADDITIONAL STUDIES:    CXR: Personally reviewed  < from: Xray Chest 1 View- PORTABLE-Urgent (12.23.22 @ 16:48) >  IMPRESSION: No acute cardiopulmonary disease process.    --- End of Report ---    < end of copied text >    Other misc imaging:   < from: CT Abdomen and Pelvis w/ Oral Cont and w/ IV Cont (12.23.22 @ 15:20) >  IMPRESSION:  Distal small bowel obstruction with transition point in the left mid   abdomen adjacent to the narrowed distal segment of the descending colon   just proximal to the colostomy.    --- End of Report ---    < end of copied text >

## 2022-12-24 NOTE — CONSULT NOTE ADULT - ASSESSMENT
41yo Female pt with PMHx rectal ca (s/p chemo/XRT, last chemo session 8/19; colostomy on 9/26) presented to [facility] with c/o abdominal pain, n/v, reduced ostomy output for 1 day found with SBO    Plan  #SBO  -managed as per surgery  -NGT in place w/ bilious drainage  -NPO  -Serial abdominal exams    #Rectal cancer  -s/p chemo    #DVT ppx as per primary team

## 2022-12-24 NOTE — PROGRESS NOTE ADULT - SUBJECTIVE AND OBJECTIVE BOX
SUBJECTIVE: Pt seen and examined at bedside this am by surgery team. Patient is lying comfortably in bed with no complaints. Pain well controlled with current regimen. Patient denies fever, nausea, vomiting, chest pain, and shortness of breath. Patient is having soft ostomy output, no gas yet.     MEDICATIONS  (STANDING):  heparin   Injectable 5000 Unit(s) SubCutaneous every 8 hours  influenza   Vaccine 0.5 milliLiter(s) IntraMuscular once  lactated ringers. 1000 milliLiter(s) (110 mL/Hr) IV Continuous <Continuous>  potassium chloride  10 mEq/100 mL IVPB 10 milliEquivalent(s) IV Intermittent every 1 hour    MEDICATIONS  (PRN):  benzocaine 15 mG/menthol 3.6 mG Lozenge 1 Lozenge Oral every 2 hours PRN Sore Throat  benzocaine 20% Spray 1 Spray(s) Topical every 6 hours PRN Sore throat  ondansetron Injectable 4 milliGRAM(s) IV Push every 3 hours PRN Nausea and/or Vomiting      Vital Signs Last 24 Hrs  T(C): 36.7 (24 Dec 2022 08:40), Max: 36.8 (23 Dec 2022 15:04)  T(F): 98 (24 Dec 2022 08:40), Max: 98.3 (23 Dec 2022 15:04)  HR: 68 (24 Dec 2022 08:40) (55 - 69)  BP: 121/74 (24 Dec 2022 08:40) (96/55 - 138/84)  BP(mean): --  RR: 20 (24 Dec 2022 08:40) (16 - 20)  SpO2: 97% (24 Dec 2022 08:40) (96% - 100%)    Parameters below as of 24 Dec 2022 08:40  Patient On (Oxygen Delivery Method): room air        Physical Exam  General: Patient is doing well and lying in bed comfortably  Constitutional: alert and oriented   Pulm: Nonlabored breathing, no respiratory distress  CV: Regular rate and rhythm, normal sinus rhythm  Abd:  soft, nontender, mildly distended, improved from last night. No rebound, no guarding.   Extremities: warm, well perfused, no edema      I&O's Detail    23 Dec 2022 07:01  -  24 Dec 2022 07:00  --------------------------------------------------------  IN:    Lactated Ringers: 1210 mL  Total IN: 1210 mL    OUT:    Nasogastric/Oral tube (mL): 300 mL    Voided (mL): 1000 mL  Total OUT: 1300 mL    Total NET: -90 mL      24 Dec 2022 07:01  -  24 Dec 2022 09:44  --------------------------------------------------------  IN:    IV PiggyBack: 100 mL    Lactated Ringers: 330 mL  Total IN: 430 mL    OUT:    Nasogastric/Oral tube (mL): 300 mL    Oral Fluid: 0 mL  Total OUT: 300 mL    Total NET: 130 mL        LABS:                        12.2   4.49  )-----------( 229      ( 24 Dec 2022 07:01 )             37.1     12-24    142  |  105  |  12  ----------------------------<  97  3.7   |  29  |  0.73    Ca    8.8      24 Dec 2022 07:01  Phos  4.3     12-24  Mg     2.1     12-24    TPro  7.8  /  Alb  4.5  /  TBili  0.3  /  DBili  x   /  AST  19  /  ALT  10  /  AlkPhos  100  12-23    PT/INR - ( 24 Dec 2022 07:01 )   PT: 14.3 sec;   INR: 1.20          PTT - ( 24 Dec 2022 07:01 )  PTT:27.4 sec

## 2022-12-25 LAB
ANION GAP SERPL CALC-SCNC: 15 MMOL/L — SIGNIFICANT CHANGE UP (ref 5–17)
BASOPHILS # BLD AUTO: 0.06 K/UL — SIGNIFICANT CHANGE UP (ref 0–0.2)
BASOPHILS NFR BLD AUTO: 1 % — SIGNIFICANT CHANGE UP (ref 0–2)
BUN SERPL-MCNC: 13 MG/DL — SIGNIFICANT CHANGE UP (ref 7–23)
CALCIUM SERPL-MCNC: 9.3 MG/DL — SIGNIFICANT CHANGE UP (ref 8.4–10.5)
CHLORIDE SERPL-SCNC: 103 MMOL/L — SIGNIFICANT CHANGE UP (ref 96–108)
CO2 SERPL-SCNC: 24 MMOL/L — SIGNIFICANT CHANGE UP (ref 22–31)
CREAT SERPL-MCNC: 0.67 MG/DL — SIGNIFICANT CHANGE UP (ref 0.5–1.3)
EGFR: 110 ML/MIN/1.73M2 — SIGNIFICANT CHANGE UP
EOSINOPHIL # BLD AUTO: 0.16 K/UL — SIGNIFICANT CHANGE UP (ref 0–0.5)
EOSINOPHIL NFR BLD AUTO: 2.7 % — SIGNIFICANT CHANGE UP (ref 0–6)
GLUCOSE SERPL-MCNC: 86 MG/DL — SIGNIFICANT CHANGE UP (ref 70–99)
HCT VFR BLD CALC: 39.8 % — SIGNIFICANT CHANGE UP (ref 34.5–45)
HGB BLD-MCNC: 12.7 G/DL — SIGNIFICANT CHANGE UP (ref 11.5–15.5)
IMM GRANULOCYTES NFR BLD AUTO: 0.3 % — SIGNIFICANT CHANGE UP (ref 0–0.9)
LYMPHOCYTES # BLD AUTO: 0.68 K/UL — LOW (ref 1–3.3)
LYMPHOCYTES # BLD AUTO: 11.7 % — LOW (ref 13–44)
MAGNESIUM SERPL-MCNC: 2 MG/DL — SIGNIFICANT CHANGE UP (ref 1.6–2.6)
MCHC RBC-ENTMCNC: 29.1 PG — SIGNIFICANT CHANGE UP (ref 27–34)
MCHC RBC-ENTMCNC: 31.9 GM/DL — LOW (ref 32–36)
MCV RBC AUTO: 91.3 FL — SIGNIFICANT CHANGE UP (ref 80–100)
MONOCYTES # BLD AUTO: 0.66 K/UL — SIGNIFICANT CHANGE UP (ref 0–0.9)
MONOCYTES NFR BLD AUTO: 11.3 % — SIGNIFICANT CHANGE UP (ref 2–14)
NEUTROPHILS # BLD AUTO: 4.24 K/UL — SIGNIFICANT CHANGE UP (ref 1.8–7.4)
NEUTROPHILS NFR BLD AUTO: 73 % — SIGNIFICANT CHANGE UP (ref 43–77)
NRBC # BLD: 0 /100 WBCS — SIGNIFICANT CHANGE UP (ref 0–0)
PHOSPHATE SERPL-MCNC: 4 MG/DL — SIGNIFICANT CHANGE UP (ref 2.5–4.5)
PLATELET # BLD AUTO: 277 K/UL — SIGNIFICANT CHANGE UP (ref 150–400)
POTASSIUM SERPL-MCNC: 3.6 MMOL/L — SIGNIFICANT CHANGE UP (ref 3.5–5.3)
POTASSIUM SERPL-SCNC: 3.6 MMOL/L — SIGNIFICANT CHANGE UP (ref 3.5–5.3)
RBC # BLD: 4.36 M/UL — SIGNIFICANT CHANGE UP (ref 3.8–5.2)
RBC # FLD: 12.2 % — SIGNIFICANT CHANGE UP (ref 10.3–14.5)
SODIUM SERPL-SCNC: 142 MMOL/L — SIGNIFICANT CHANGE UP (ref 135–145)
WBC # BLD: 5.82 K/UL — SIGNIFICANT CHANGE UP (ref 3.8–10.5)
WBC # FLD AUTO: 5.82 K/UL — SIGNIFICANT CHANGE UP (ref 3.8–10.5)

## 2022-12-25 PROCEDURE — 74019 RADEX ABDOMEN 2 VIEWS: CPT | Mod: 26

## 2022-12-25 RX ORDER — POTASSIUM CHLORIDE 20 MEQ
10 PACKET (EA) ORAL
Refills: 0 | Status: COMPLETED | OUTPATIENT
Start: 2022-12-25 | End: 2022-12-25

## 2022-12-25 RX ORDER — ACETAMINOPHEN 500 MG
1000 TABLET ORAL ONCE
Refills: 0 | Status: COMPLETED | OUTPATIENT
Start: 2022-12-25 | End: 2022-12-25

## 2022-12-25 RX ORDER — POTASSIUM CHLORIDE 20 MEQ
10 PACKET (EA) ORAL ONCE
Refills: 0 | Status: DISCONTINUED | OUTPATIENT
Start: 2022-12-25 | End: 2022-12-25

## 2022-12-25 RX ADMIN — Medication 400 MILLIGRAM(S): at 05:07

## 2022-12-25 RX ADMIN — SODIUM CHLORIDE 110 MILLILITER(S): 9 INJECTION, SOLUTION INTRAVENOUS at 12:42

## 2022-12-25 RX ADMIN — SODIUM CHLORIDE 110 MILLILITER(S): 9 INJECTION, SOLUTION INTRAVENOUS at 20:39

## 2022-12-25 RX ADMIN — Medication 400 MILLIGRAM(S): at 20:38

## 2022-12-25 RX ADMIN — Medication 1000 MILLIGRAM(S): at 05:30

## 2022-12-25 RX ADMIN — SODIUM CHLORIDE 110 MILLILITER(S): 9 INJECTION, SOLUTION INTRAVENOUS at 05:08

## 2022-12-25 RX ADMIN — Medication 1000 MILLIGRAM(S): at 21:00

## 2022-12-25 RX ADMIN — Medication 1000 MILLIGRAM(S): at 00:10

## 2022-12-25 RX ADMIN — HEPARIN SODIUM 5000 UNIT(S): 5000 INJECTION INTRAVENOUS; SUBCUTANEOUS at 14:24

## 2022-12-25 RX ADMIN — HEPARIN SODIUM 5000 UNIT(S): 5000 INJECTION INTRAVENOUS; SUBCUTANEOUS at 05:07

## 2022-12-25 RX ADMIN — HEPARIN SODIUM 5000 UNIT(S): 5000 INJECTION INTRAVENOUS; SUBCUTANEOUS at 22:42

## 2022-12-25 RX ADMIN — Medication 100 MILLIEQUIVALENT(S): at 07:42

## 2022-12-25 RX ADMIN — Medication 100 MILLIEQUIVALENT(S): at 12:42

## 2022-12-25 NOTE — PROGRESS NOTE ADULT - SUBJECTIVE AND OBJECTIVE BOX
SUBJECTIVE: Pt seen and examined at bedside this am by surgery team. NG Tube in position, flushed. Only complaint is mild pain from the NG Tube. Ostomy with stool and gas.       Vital Signs Last 24 Hrs  T(C): 36.7 (25 Dec 2022 08:50), Max: 36.8 (24 Dec 2022 13:11)  T(F): 98.1 (25 Dec 2022 08:50), Max: 98.3 (24 Dec 2022 13:11)  HR: 70 (25 Dec 2022 08:50) (70 - 81)  BP: 117/69 (25 Dec 2022 08:50) (115/70 - 124/86)  BP(mean): 99 (24 Dec 2022 17:31) (99 - 99)  RR: 17 (25 Dec 2022 08:50) (16 - 19)  SpO2: 96% (25 Dec 2022 08:50) (95% - 98%)    Parameters below as of 25 Dec 2022 08:50  Patient On (Oxygen Delivery Method): room air        PHYSICAL EXAM:   Gen: Awake, alert, NAD, resting comfortably   CV: RRR  Pulm: no respiratory distress on RA  Abd: NG Tube LWIS, ostomy with stool and gas, soft, ND, NTTP, no rebound or guarding   Ext: WWP, no edema, SCDs in place     I&O's Detail    24 Dec 2022 07:01  -  25 Dec 2022 07:00  --------------------------------------------------------  IN:    IV PiggyBack: 200 mL    IV PiggyBack: 100 mL    Lactated Ringers: 2530 mL  Total IN: 2830 mL    OUT:    Colostomy (mL): 1 mL    Nasogastric/Oral tube (mL): 1000 mL    Oral Fluid: 0 mL    Voided (mL): 1750 mL  Total OUT: 2751 mL    Total NET: 79 mL          LABS:                        12.7   5.82  )-----------( 277      ( 25 Dec 2022 06:41 )             39.8     12-25    142  |  103  |  13  ----------------------------<  86  3.6   |  24  |  0.67    Ca    9.3      25 Dec 2022 06:41  Phos  4.0     12-25  Mg     2.0     12-25    TPro  7.8  /  Alb  4.5  /  TBili  0.3  /  DBili  x   /  AST  19  /  ALT  10  /  AlkPhos  100  12-23    LIVER FUNCTIONS - ( 23 Dec 2022 12:29 )  Alb: 4.5 g/dL / Pro: 7.8 g/dL / ALK PHOS: 100 U/L / ALT: 10 U/L / AST: 19 U/L / GGT: x           PT/INR - ( 24 Dec 2022 07:01 )   PT: 14.3 sec;   INR: 1.20          PTT - ( 24 Dec 2022 07:01 )  PTT:27.4 sec  CAPILLARY BLOOD GLUCOSE          RADIOLOGY & ADDITIONAL STUDIES:

## 2022-12-25 NOTE — CHART NOTE - NSCHARTNOTEFT_GEN_A_CORE
SERIAL ABDOMINAL EXAM    S: Pt appears well. Pain well controlled with medication. -n/-v/no gas in ostomy, minimal piece of thin dark stool in bag. NGT in place with light green output. Denies CP, SOB, KEN, calf tenderness.    O:  T(C): 36.8 (12-23-22 @ 20:40), Max: 36.8 (12-23-22 @ 20:40)  T(F): 98.2 (12-23-22 @ 20:40), Max: 98.2 (12-23-22 @ 20:40)  HR: 59 (12-23-22 @ 20:40) (59 - 66)  BP: 125/72 (12-23-22 @ 20:40) (121/70 - 125/72)  RR: 16 (12-23-22 @ 20:40) (16 - 17)  SpO2: 96% (12-23-22 @ 20:40) (96% - 100%)  Wt(kg): --                        14.3   6.31  )-----------( 262      ( 23 Dec 2022 12:29 )             42.8     12-23    140  |  102  |  13  ----------------------------<  117<H>  3.6   |  25  |  0.65    Ca    9.4      23 Dec 2022 12:29  Phos  2.6     12-23    TPro  7.8  /  Alb  4.5  /  TBili  0.3  /  DBili  x   /  AST  19  /  ALT  10  /  AlkPhos  100  12-23      Gen: NAD, resting comfortably in bed  C/V: NSR  Pulm: Nonlabored breathing, no respiratory distress  Abd: soft, NT/ND. Stoma pink, well perfused. No gas in ostomy, minimal piece of thin dark stool in bag. NGT in place with light green output.  Extrem: WWP, no calf edema, SCDs in place      A/P: 42yFemale s/p above procedure  Diet: NPO/IVF  Pain/nausea control  DVT ppX: SQH
SERIAL ABDOMINAL EXAM    SUBJECTIVE  Pt seen an examined at bedside. Patient is lying in bed comfortably, pain well controlled. Patient denies fever, nausea, vomiting, chest pain, shortness of breath, and calf tenderness. Ostomy has soft output but no gas yet.     T(C): 36.8 (12-24-22 @ 13:11), Max: 36.8 (12-23-22 @ 15:04)  HR: 72 (12-24-22 @ 13:11) (55 - 72)  BP: 115/70 (12-24-22 @ 13:11) (96/55 - 136/79)  RR: 19 (12-24-22 @ 13:11) (16 - 20)  SpO2: 98% (12-24-22 @ 13:11) (96% - 100%)    OBJECTIVE    PHYSICAL EXAM  General: Patient is doing well and lying in bed comfortably  Constitutional: Alert and Oriented  Pulm: Nonlabored breathing, no respiratory distress  CV: Regular rate and rhythm  Abd: soft, nontender, nondistended. No guarding or rebound  Extremities: warm, well perfused, no edema    A/P  41yo Female pt with PMHx Rectal cancer (Stage IIIC) s/p neoadjuvant chemorads (4 doses of chemotherapy last one 08/19; 27 radiation sessions); PSHx APR (09/2022 @ [facility]). Admitted with SBO    NPO/IVF  JOYCELYN  NGT to [facility]  monitor for bowel function  SQH/SCDs/OOBA/IS  AM Labs
SERIAL ABDOMINAL EXAM    SUBJECTIVE  Pt seen an examined at bedside. Patient is lying in bed comfortably, pain well controlled. Patient denies fever, nausea, vomiting, chest pain, shortness of breath, and calf tenderness. No gas in ostomy appliance yet. NGT advanced and in good position, per CXR.     Vital Signs Last 24 Hrs  T(C): 36.3 (24 Dec 2022 20:32), Max: 36.8 (24 Dec 2022 00:30)  T(F): 97.3 (24 Dec 2022 20:32), Max: 98.3 (24 Dec 2022 13:11)  HR: 74 (24 Dec 2022 20:32) (55 - 81)  BP: 118/78 (24 Dec 2022 20:32) (115/70 - 136/79)  BP(mean): 99 (24 Dec 2022 17:31) (99 - 99)  RR: 18 (24 Dec 2022 20:32) (16 - 20)  SpO2: 98% (24 Dec 2022 20:32) (97% - 99%)      OBJECTIVE    PHYSICAL EXAM  General: Patient is doing well and lying in bed comfortably  Constitutional: Alert and Oriented  Pulm: Nonlabored breathing, no respiratory distress  CV: Regular rate and rhythm  Abd: soft, nontender, nondistended. No guarding or rebound  Extremities: warm, well perfused, no edema    A/P  41yo Female pt with PMHx Rectal cancer (Stage IIIC) s/p neoadjuvant chemorads (4 doses of chemotherapy last one 08/19; 27 radiation sessions); PSHx APR (09/2022 @ [facility]). Admitted with SBO    NPO/IVF  JOYCELYN  NGT to [facility]  monitor for bowel function  SQH/SCDs/OOBA/IS  AM Labs
Serial Abdominal Exam @ 2300    S: Pt feels well and without abdominal discomfort. NGT in place. Patient attests to ostomy output and gas in bag. Denies CP, SOB, calf tenderness.     O:  T(C): 37.1 (12-25-22 @ 20:30), Max: 37.1 (12-25-22 @ 20:30)  T(F): 98.7 (12-25-22 @ 20:30), Max: 98.7 (12-25-22 @ 20:30)  HR: 74 (12-25-22 @ 20:30) (74 - 74)  BP: 120/72 (12-25-22 @ 20:30) (120/72 - 120/72)  RR: 17 (12-25-22 @ 20:30) (17 - 17)  SpO2: 99% (12-25-22 @ 20:30) (99% - 99%)  Wt(kg): --                        12.7   5.82  )-----------( 277      ( 25 Dec 2022 06:41 )             39.8     12-25    142  |  103  |  13  ----------------------------<  86  3.6   |  24  |  0.67    Ca    9.3      25 Dec 2022 06:41  Phos  4.0     12-25  Mg     2.0     12-25        Gen: NAD, resting comfortably in bed  C/V: pulses present and strong in the b/l upper extremities   Pulm: Nonlabored breathing, no respiratory distress  Abd: soft, mildy distended, NTTTP , no rebound or guarding   Ostomy: p/p/p  Extrem: WWP, no calf edema, SCDs in place     Abdominal exam unremarkable. Pt ostomy appears to be functional  Will continue to monitor with NGT decompression and JOYCELYN.

## 2022-12-26 LAB
ANION GAP SERPL CALC-SCNC: 17 MMOL/L — SIGNIFICANT CHANGE UP (ref 5–17)
BUN SERPL-MCNC: 12 MG/DL — SIGNIFICANT CHANGE UP (ref 7–23)
CALCIUM SERPL-MCNC: 8.7 MG/DL — SIGNIFICANT CHANGE UP (ref 8.4–10.5)
CHLORIDE SERPL-SCNC: 105 MMOL/L — SIGNIFICANT CHANGE UP (ref 96–108)
CO2 SERPL-SCNC: 19 MMOL/L — LOW (ref 22–31)
CREAT SERPL-MCNC: 0.59 MG/DL — SIGNIFICANT CHANGE UP (ref 0.5–1.3)
EGFR: 114 ML/MIN/1.73M2 — SIGNIFICANT CHANGE UP
GLUCOSE SERPL-MCNC: 57 MG/DL — LOW (ref 70–99)
HCT VFR BLD CALC: 35 % — SIGNIFICANT CHANGE UP (ref 34.5–45)
HGB BLD-MCNC: 11.2 G/DL — LOW (ref 11.5–15.5)
MAGNESIUM SERPL-MCNC: 1.8 MG/DL — SIGNIFICANT CHANGE UP (ref 1.6–2.6)
MCHC RBC-ENTMCNC: 29.4 PG — SIGNIFICANT CHANGE UP (ref 27–34)
MCHC RBC-ENTMCNC: 32 GM/DL — SIGNIFICANT CHANGE UP (ref 32–36)
MCV RBC AUTO: 91.9 FL — SIGNIFICANT CHANGE UP (ref 80–100)
NRBC # BLD: 0 /100 WBCS — SIGNIFICANT CHANGE UP (ref 0–0)
PHOSPHATE SERPL-MCNC: 3.2 MG/DL — SIGNIFICANT CHANGE UP (ref 2.5–4.5)
PLATELET # BLD AUTO: 229 K/UL — SIGNIFICANT CHANGE UP (ref 150–400)
POTASSIUM SERPL-MCNC: 4 MMOL/L — SIGNIFICANT CHANGE UP (ref 3.5–5.3)
POTASSIUM SERPL-SCNC: 4 MMOL/L — SIGNIFICANT CHANGE UP (ref 3.5–5.3)
RBC # BLD: 3.81 M/UL — SIGNIFICANT CHANGE UP (ref 3.8–5.2)
RBC # FLD: 12 % — SIGNIFICANT CHANGE UP (ref 10.3–14.5)
SODIUM SERPL-SCNC: 141 MMOL/L — SIGNIFICANT CHANGE UP (ref 135–145)
WBC # BLD: 4.37 K/UL — SIGNIFICANT CHANGE UP (ref 3.8–10.5)
WBC # FLD AUTO: 4.37 K/UL — SIGNIFICANT CHANGE UP (ref 3.8–10.5)

## 2022-12-26 PROCEDURE — 99232 SBSQ HOSP IP/OBS MODERATE 35: CPT

## 2022-12-26 RX ORDER — MAGNESIUM SULFATE 500 MG/ML
1 VIAL (ML) INJECTION ONCE
Refills: 0 | Status: COMPLETED | OUTPATIENT
Start: 2022-12-26 | End: 2022-12-26

## 2022-12-26 RX ADMIN — HEPARIN SODIUM 5000 UNIT(S): 5000 INJECTION INTRAVENOUS; SUBCUTANEOUS at 22:03

## 2022-12-26 RX ADMIN — Medication 100 GRAM(S): at 11:25

## 2022-12-26 RX ADMIN — HEPARIN SODIUM 5000 UNIT(S): 5000 INJECTION INTRAVENOUS; SUBCUTANEOUS at 13:26

## 2022-12-26 RX ADMIN — HEPARIN SODIUM 5000 UNIT(S): 5000 INJECTION INTRAVENOUS; SUBCUTANEOUS at 05:12

## 2022-12-26 RX ADMIN — SODIUM CHLORIDE 110 MILLILITER(S): 9 INJECTION, SOLUTION INTRAVENOUS at 05:12

## 2022-12-26 NOTE — PROGRESS NOTE ADULT - SUBJECTIVE AND OBJECTIVE BOX
INTERVAL EVENTS: No o/n events. NGT in place. Colostomy with decreased output. Reports occasional mild ab cramping. Denies CP, dyspnea, palpitations, presyncope, syncope, f/c/n/v.     REVIEW OF SYSTEMS:  Constitutional:     [X] negative [ ] fevers [ ] chills [ ] weight loss [ ] weight gain  HEENT:                  [X] negative [ ] dry eyes [ ] eye irritation [ ] postnasal drip [ ] nasal congestion  CV:                         [X] negative  [ ] chest pain [ ] orthopnea [ ] palpitations [ ] murmur  Resp:                     [X] negative [ ] cough [ ] shortness of breath [ ] wheezing [ ] sputum [ ] hemoptysis  GI:                          [ ] negative [ ] nausea [ ] vomiting [ ] diarrhea [ ] constipation [X] abd pain [ ] dysphagia   :                        [X] negative [ ] dysuria [ ] nocturia [ ] hematuria [ ] increased urinary frequency  MSK:                      [X] negative [ ] back pain [ ] myalgias [ ] arthralgias [ ] fracture  Skin:                       [X] negative [ ] rash [ ] itch  Neuro:                   [X] negative [ ] headache [ ] dizziness [ ] syncope [ ] weakness [ ] numbness  Psych:                    [X] negative [ ] anxiety [ ] depression  Endo:                     [X] negative [ ] diabetes [ ] thyroid problem  Heme/Lymph:      [X] negative [ ] anemia [ ] bleeding problem  Allergic/Immune: [X] negative [ ] itchy eyes [ ] nasal discharge [ ] hives [ ] angioedema    [X] All other systems negative or otherwise described above.  [ ] Unable to assess ROS due to ________.    PAST MEDICAL & SURGICAL HISTORY:  Rectal cancer    History of rectal surgery      MEDICATIONS  (STANDING):  heparin   Injectable 5000 Unit(s) SubCutaneous every 8 hours  influenza   Vaccine 0.5 milliLiter(s) IntraMuscular once  lactated ringers. 1000 milliLiter(s) (110 mL/Hr) IV Continuous <Continuous>    MEDICATIONS  (PRN):  benzocaine 15 mG/menthol 3.6 mG Lozenge 1 Lozenge Oral every 2 hours PRN Sore Throat  benzocaine 20% Spray 1 Spray(s) Topical once PRN NGT discomfort  ondansetron Injectable 4 milliGRAM(s) IV Push every 3 hours PRN Nausea and/or Vomiting    ICU Vital Signs Last 24 Hrs  T(C): 36.2 (26 Dec 2022 14:34), Max: 37.3 (25 Dec 2022 16:53)  T(F): 97.2 (26 Dec 2022 14:34), Max: 99.1 (25 Dec 2022 16:53)  HR: 77 (26 Dec 2022 14:34) (74 - 83)  BP: 121/77 (26 Dec 2022 14:34) (106/68 - 121/77)  BP(mean): 81 (26 Dec 2022 05:06) (81 - 81)  ABP: --  ABP(mean): --  RR: 17 (26 Dec 2022 14:34) (17 - 17)  SpO2: 98% (26 Dec 2022 14:34) (97% - 99%)    O2 Parameters below as of 26 Dec 2022 14:34  Patient On (Oxygen Delivery Method): room air          Orthostatic VS    Daily     Daily   I&O's Summary    25 Dec 2022 07:01  -  26 Dec 2022 07:00  --------------------------------------------------------  IN: 2420 mL / OUT: 1779 mL / NET: 641 mL    26 Dec 2022 07:01  -  26 Dec 2022 15:18  --------------------------------------------------------  IN: 550 mL / OUT: 1276 mL / NET: -726 mL        PHYSICAL EXAM:  GENERAL: No acute distress, well-developed  HEAD:  Atraumatic, Normocephalic  ENT: EOMI, conjunctiva and sclera clear, Neck supple, No JVD, moist mucosa  CHEST/LUNG: Clear to auscultation bilaterally; No wheeze, equal breath sounds bilaterally   HEART: Regular rate and rhythm; No murmurs, rubs, or gallops, radial and DP 2+ b/l, euvolemic  ABDOMEN: Soft, mild distention, hypoactive BS, LLQ colostomy  EXTREMITIES:  No clubbing, cyanosis, or edema  PSYCH: Nl behavior, nl affect  NEUROLOGY: AAOx3, non-focal  SKIN: Normal color, No rashes or lesions    LABS:                        11.2   4.37  )-----------( 229      ( 26 Dec 2022 07:15 )             35.0       12-26    141  |  105  |  12  ----------------------------<  57<L>  4.0   |  19<L>  |  0.59    Ca    8.7      26 Dec 2022 07:15  Phos  3.2     12-26  Mg     1.8     12-26          RADIOLOGY & ADDITIONAL STUDIES:    CXR: Personally reviewed  < from: Xray Chest 1 View- PORTABLE-Urgent (12.23.22 @ 16:48) >  IMPRESSION: No acute cardiopulmonary disease process.    --- End of Report ---    < end of copied text >    Other misc imaging:   < from: Xray Abdomen 2 Views (12.25.22 @ 12:59) >  Impression: Since 12/24/2022 there is been interval improvement in small   bowel obstruction.        ******PRELIMINARY REPORT******      ******PRELIMINARY REPORT******     < end of copied text >    < from: CT Abdomen and Pelvis w/ Oral Cont and w/ IV Cont (12.23.22 @ 15:20) >  IMPRESSION:  Distal small bowel obstruction with transition point in the left mid   abdomen adjacent to the narrowed distal segment of the descending colon   just proximal to the colostomy.    --- End of Report ---    < end of copied text >

## 2022-12-26 NOTE — PROGRESS NOTE ADULT - SUBJECTIVE AND OBJECTIVE BOX
General Surgery Progress Note    SUBJECTIVE:   Patient seen and examined at bedside by chief. Patient reports she is doing well this AM. States she has minimal abdominal pain. States she is not nauseous and has been ambulating often. Denies mild discomfort with NGT. Also reports that has been minimal gas of in ostomy bag with small amount of brown liquids.    Vital Signs Last 24 Hrs  T(C): 36.4 (26 Dec 2022 08:30), Max: 37.3 (25 Dec 2022 16:53)  T(F): 97.5 (26 Dec 2022 08:30), Max: 99.1 (25 Dec 2022 16:53)  HR: 78 (26 Dec 2022 08:30) (74 - 83)  BP: 110/68 (26 Dec 2022 08:30) (106/68 - 120/72)  BP(mean): 81 (26 Dec 2022 05:06) (81 - 81)  RR: 17 (26 Dec 2022 08:30) (17 - 17)  SpO2: 97% (26 Dec 2022 08:30) (97% - 99%)    Parameters below as of 26 Dec 2022 08:30  Patient On (Oxygen Delivery Method): room air        I&O's Summary    25 Dec 2022 07:01  -  26 Dec 2022 07:00  --------------------------------------------------------  IN: 2420 mL / OUT: 1779 mL / NET: 641 mL    26 Dec 2022 07:01  -  26 Dec 2022 10:24  --------------------------------------------------------  IN: 220 mL / OUT: 75 mL / NET: 145 mL        Physical Exam:  General: Resting comfortably in bed, NAD  HEENT: ATNC  Pulmonary: Equal chest wall expansion b/l, no respiratory distress  Cardiovascular: NSR  Abdomen: Soft, nondisteded, mild epigastric ttp, no rebound or guarding. Ostomy noted to be pink and patent with minimal gas and small amount of liquid brown stool  Extremities: WWP, SCDs in place, No significant edema appreciated    LABS:                        11.2   4.37  )-----------( 229      ( 26 Dec 2022 07:15 )             35.0     12-26    141  |  105  |  12  ----------------------------<  57<L>  4.0   |  19<L>  |  0.59    Ca    8.7      26 Dec 2022 07:15  Phos  3.2     12-26  Mg     1.8     12-26

## 2022-12-27 LAB
ANION GAP SERPL CALC-SCNC: 17 MMOL/L — SIGNIFICANT CHANGE UP (ref 5–17)
APTT BLD: 36.8 SEC — HIGH (ref 27.5–35.5)
BLD GP AB SCN SERPL QL: NEGATIVE — SIGNIFICANT CHANGE UP
BLD GP AB SCN SERPL QL: NEGATIVE — SIGNIFICANT CHANGE UP
BUN SERPL-MCNC: 12 MG/DL — SIGNIFICANT CHANGE UP (ref 7–23)
CALCIUM SERPL-MCNC: 8.7 MG/DL — SIGNIFICANT CHANGE UP (ref 8.4–10.5)
CHLORIDE SERPL-SCNC: 107 MMOL/L — SIGNIFICANT CHANGE UP (ref 96–108)
CO2 SERPL-SCNC: 16 MMOL/L — LOW (ref 22–31)
CREAT SERPL-MCNC: 0.57 MG/DL — SIGNIFICANT CHANGE UP (ref 0.5–1.3)
EGFR: 115 ML/MIN/1.73M2 — SIGNIFICANT CHANGE UP
GLUCOSE SERPL-MCNC: 62 MG/DL — LOW (ref 70–99)
HCT VFR BLD CALC: 35.3 % — SIGNIFICANT CHANGE UP (ref 34.5–45)
HGB BLD-MCNC: 11.1 G/DL — LOW (ref 11.5–15.5)
INR BLD: 1.11 — SIGNIFICANT CHANGE UP (ref 0.88–1.16)
MAGNESIUM SERPL-MCNC: 1.8 MG/DL — SIGNIFICANT CHANGE UP (ref 1.6–2.6)
MCHC RBC-ENTMCNC: 28.9 PG — SIGNIFICANT CHANGE UP (ref 27–34)
MCHC RBC-ENTMCNC: 31.4 GM/DL — LOW (ref 32–36)
MCV RBC AUTO: 91.9 FL — SIGNIFICANT CHANGE UP (ref 80–100)
NRBC # BLD: 0 /100 WBCS — SIGNIFICANT CHANGE UP (ref 0–0)
PHOSPHATE SERPL-MCNC: 3.7 MG/DL — SIGNIFICANT CHANGE UP (ref 2.5–4.5)
PLATELET # BLD AUTO: 237 K/UL — SIGNIFICANT CHANGE UP (ref 150–400)
POTASSIUM SERPL-MCNC: 4 MMOL/L — SIGNIFICANT CHANGE UP (ref 3.5–5.3)
POTASSIUM SERPL-SCNC: 4 MMOL/L — SIGNIFICANT CHANGE UP (ref 3.5–5.3)
PROTHROM AB SERPL-ACNC: 13.2 SEC — SIGNIFICANT CHANGE UP (ref 10.5–13.4)
RBC # BLD: 3.84 M/UL — SIGNIFICANT CHANGE UP (ref 3.8–5.2)
RBC # FLD: 11.9 % — SIGNIFICANT CHANGE UP (ref 10.3–14.5)
RH IG SCN BLD-IMP: POSITIVE — SIGNIFICANT CHANGE UP
RH IG SCN BLD-IMP: POSITIVE — SIGNIFICANT CHANGE UP
SARS-COV-2 RNA SPEC QL NAA+PROBE: SIGNIFICANT CHANGE UP
SODIUM SERPL-SCNC: 140 MMOL/L — SIGNIFICANT CHANGE UP (ref 135–145)
WBC # BLD: 3.69 K/UL — LOW (ref 3.8–10.5)
WBC # FLD AUTO: 3.69 K/UL — LOW (ref 3.8–10.5)

## 2022-12-27 PROCEDURE — 36569 INSJ PICC 5 YR+ W/O IMAGING: CPT

## 2022-12-27 PROCEDURE — 76937 US GUIDE VASCULAR ACCESS: CPT | Mod: 26,59

## 2022-12-27 RX ORDER — HYDROMORPHONE HYDROCHLORIDE 2 MG/ML
0.25 INJECTION INTRAMUSCULAR; INTRAVENOUS; SUBCUTANEOUS ONCE
Refills: 0 | Status: DISCONTINUED | OUTPATIENT
Start: 2022-12-27 | End: 2022-12-29

## 2022-12-27 RX ORDER — ELECTROLYTE SOLUTION,INJ
1 VIAL (ML) INTRAVENOUS
Refills: 0 | Status: DISCONTINUED | OUTPATIENT
Start: 2022-12-27 | End: 2022-12-27

## 2022-12-27 RX ORDER — DEXTROSE 10 % IN WATER 10 %
250 INTRAVENOUS SOLUTION INTRAVENOUS ONCE
Refills: 0 | Status: DISCONTINUED | OUTPATIENT
Start: 2022-12-27 | End: 2022-12-29

## 2022-12-27 RX ORDER — SODIUM CHLORIDE 9 MG/ML
1000 INJECTION, SOLUTION INTRAVENOUS
Refills: 0 | Status: DISCONTINUED | OUTPATIENT
Start: 2022-12-27 | End: 2022-12-29

## 2022-12-27 RX ORDER — CHLORHEXIDINE GLUCONATE 213 G/1000ML
1 SOLUTION TOPICAL
Refills: 0 | Status: DISCONTINUED | OUTPATIENT
Start: 2022-12-27 | End: 2022-12-29

## 2022-12-27 RX ORDER — DIATRIZOATE MEGLUMINE 180 MG/ML
30 INJECTION, SOLUTION INTRAVESICAL ONCE
Refills: 0 | Status: COMPLETED | OUTPATIENT
Start: 2022-12-27 | End: 2022-12-27

## 2022-12-27 RX ORDER — SODIUM CHLORIDE 9 MG/ML
10 INJECTION INTRAMUSCULAR; INTRAVENOUS; SUBCUTANEOUS
Refills: 0 | Status: DISCONTINUED | OUTPATIENT
Start: 2022-12-27 | End: 2022-12-29

## 2022-12-27 RX ORDER — DEXTROSE 10 % IN WATER 10 %
250 INTRAVENOUS SOLUTION INTRAVENOUS ONCE
Refills: 0 | Status: COMPLETED | OUTPATIENT
Start: 2022-12-27 | End: 2022-12-27

## 2022-12-27 RX ORDER — MAGNESIUM SULFATE 500 MG/ML
1 VIAL (ML) INJECTION ONCE
Refills: 0 | Status: COMPLETED | OUTPATIENT
Start: 2022-12-27 | End: 2022-12-27

## 2022-12-27 RX ORDER — DEXTROSE 50 % IN WATER 50 %
25 SYRINGE (ML) INTRAVENOUS ONCE
Refills: 0 | Status: DISCONTINUED | OUTPATIENT
Start: 2022-12-27 | End: 2022-12-27

## 2022-12-27 RX ORDER — I.V. FAT EMULSION 20 G/100ML
0.79 EMULSION INTRAVENOUS
Qty: 50 | Refills: 0 | Status: DISCONTINUED | OUTPATIENT
Start: 2022-12-27 | End: 2022-12-27

## 2022-12-27 RX ADMIN — Medication 750 MILLILITER(S): at 08:31

## 2022-12-27 RX ADMIN — I.V. FAT EMULSION 20.83 GM/KG/DAY: 20 EMULSION INTRAVENOUS at 22:18

## 2022-12-27 RX ADMIN — DIATRIZOATE MEGLUMINE 30 MILLILITER(S): 180 INJECTION, SOLUTION INTRAVESICAL at 07:52

## 2022-12-27 RX ADMIN — HEPARIN SODIUM 5000 UNIT(S): 5000 INJECTION INTRAVENOUS; SUBCUTANEOUS at 13:56

## 2022-12-27 RX ADMIN — SODIUM CHLORIDE 100 MILLILITER(S): 9 INJECTION, SOLUTION INTRAVENOUS at 19:20

## 2022-12-27 RX ADMIN — Medication 1 EACH: at 22:17

## 2022-12-27 RX ADMIN — Medication 1 EACH: at 21:34

## 2022-12-27 RX ADMIN — HEPARIN SODIUM 5000 UNIT(S): 5000 INJECTION INTRAVENOUS; SUBCUTANEOUS at 06:13

## 2022-12-27 RX ADMIN — I.V. FAT EMULSION 20.83 GM/KG/DAY: 20 EMULSION INTRAVENOUS at 21:34

## 2022-12-27 RX ADMIN — SODIUM CHLORIDE 100 MILLILITER(S): 9 INJECTION, SOLUTION INTRAVENOUS at 11:37

## 2022-12-27 RX ADMIN — SODIUM CHLORIDE 110 MILLILITER(S): 9 INJECTION, SOLUTION INTRAVENOUS at 00:08

## 2022-12-27 RX ADMIN — Medication 100 GRAM(S): at 12:04

## 2022-12-27 RX ADMIN — HEPARIN SODIUM 5000 UNIT(S): 5000 INJECTION INTRAVENOUS; SUBCUTANEOUS at 22:16

## 2022-12-27 NOTE — DIETITIAN INITIAL EVALUATION ADULT - NSFNSGIIOFT_GEN_A_CORE
12-26-22 @ 07:01  -  12-27-22 @ 07:00  --------------------------------------------------------  OUT:    Colostomy (mL): 2 mL    Nasogastric/Oral tube (mL): 1225 mL  Total OUT: 1227 mL    Total NET: -1227 mL      12-27-22 @ 07:01  -  12-27-22 @ 14:02  --------------------------------------------------------  OUT:    Colostomy (mL): 0 mL    Nasogastric/Oral tube (mL): 200 mL  Total OUT: 200 mL    Total NET: -200 mL

## 2022-12-27 NOTE — DIETITIAN INITIAL EVALUATION ADULT - COLLABORATION WITH OTHER PROVIDERS
RD has collaborated with team in regards to diet/EN/PN recs, ONS/nourishment recs, pt's overall nutritional status.

## 2022-12-27 NOTE — DIETITIAN INITIAL EVALUATION ADULT - NUTRITIONGOAL OUTCOME1
Pt to consistently meet at least 75% of EEE via tolerated route that is consistent with GOC and will no longer exhibit s/s of malnutrition during hospital stay;

## 2022-12-27 NOTE — DIETITIAN INITIAL EVALUATION ADULT - ADD RECOMMEND
1. Recommend TPN via central line: 335g Dex, 90g AA, 50g 20% Lipids to provide in total 2000Kcal, 90g protein, 3.66 GIR, 1.4g/kg ABW protein  Recommend checking TG before starting TPN and then check TG weekly. Check Mg, Phos, K daily and POC BG Q6hrs. Trend daily weights. Fluids and lytes per MD discretion. Start at 150g Dex on Day 1, 250g Dex on Day 2, and advance to goal of 335g Dex on Day 3.  2. Encourage pt to meet nutritional needs as able   3. Monitor PO intakes, trend weights (weekly), monitor skin integrity, monitor labs (electrolytes, CMP), monitor GI fxn   4. Encourage adherence to diet education (reinforce as able)   5. Recommend MVI   6. Pain and bowel regimen per team   7. Will continue to assess/honor preferences as able   8. Align nutrition interventions with GOC at all times

## 2022-12-27 NOTE — PROGRESS NOTE ADULT - SUBJECTIVE AND OBJECTIVE BOX
SUBJECTIVE:  Patient seen and examined on AM rounds with chief resident. No acute events overnight. This morning, patient is feeling ok. Discouraged from minimal improvement while being in the hospital. Denies abdominal pain, nausea, vomiting, fever, and chills. Minimal ostomy output. NGT flushed bedside.     MEDICATIONS  (STANDING):  diatrizoate meglumine/diatrizoate sodium. 30 milliLiter(s) Oral once  heparin   Injectable 5000 Unit(s) SubCutaneous every 8 hours  influenza   Vaccine 0.5 milliLiter(s) IntraMuscular once  lactated ringers. 1000 milliLiter(s) (110 mL/Hr) IV Continuous <Continuous>    MEDICATIONS  (PRN):  benzocaine 15 mG/menthol 3.6 mG Lozenge 1 Lozenge Oral every 2 hours PRN Sore Throat  benzocaine 20% Spray 1 Spray(s) Topical once PRN NGT discomfort  ondansetron Injectable 4 milliGRAM(s) IV Push every 3 hours PRN Nausea and/or Vomiting      Vital Signs Last 24 Hrs  T(C): 36.6 (27 Dec 2022 06:02), Max: 37.2 (26 Dec 2022 18:04)  T(F): 97.8 (27 Dec 2022 06:02), Max: 99 (26 Dec 2022 18:04)  HR: 75 (27 Dec 2022 06:02) (75 - 80)  BP: 117/63 (27 Dec 2022 06:02) (109/68 - 122/67)  BP(mean): 81 (27 Dec 2022 06:02) (81 - 81)  RR: 18 (27 Dec 2022 06:02) (17 - 18)  SpO2: 97% (27 Dec 2022 06:02) (97% - 99%)    Parameters below as of 27 Dec 2022 06:02  Patient On (Oxygen Delivery Method): room air    Physical Exam:  General: NAD, resting comfortably in bed  Pulmonary: Nonlabored breathing, no respiratory distress  Cardiovascular: NSR  Abdominal: soft, NT/ND, NGT in place   Extremities: WWP, normal strength    I&O's Summary    26 Dec 2022 07:01  -  27 Dec 2022 07:00  --------------------------------------------------------  IN: 2640 mL / OUT: 3027 mL / NET: -387 mL        LABS:                        11.1   3.69  )-----------( 237      ( 27 Dec 2022 05:43 )             35.3     12-27    140  |  107  |  12  ----------------------------<  62<L>  4.0   |  16<L>  |  0.57    Ca    8.7      27 Dec 2022 05:43  Phos  3.7     12-27  Mg     1.8     12-27

## 2022-12-27 NOTE — DIETITIAN INITIAL EVALUATION ADULT - OTHER CALCULATIONS
Based on Standards of Care pt within % IBW thus actual body weight used for all calculations. Needs adjusted for cancer status, malnutrition.

## 2022-12-27 NOTE — DIETITIAN INITIAL EVALUATION ADULT - PERTINENT MEDS FT
MEDICATIONS  (STANDING):  chlorhexidine 2% Cloths 1 Application(s) Topical <User Schedule>  dextrose 10% Bolus 250 milliLiter(s) IV Bolus once  dextrose 5% + lactated ringers. 1000 milliLiter(s) (100 mL/Hr) IV Continuous <Continuous>  fat emulsion (Fish Oil and Plant Based) 20% Infusion 0.7874 Gm/kG/Day (20.83 mL/Hr) IV Continuous <Continuous>  heparin   Injectable 5000 Unit(s) SubCutaneous every 8 hours  influenza   Vaccine 0.5 milliLiter(s) IntraMuscular once  Parenteral Nutrition - Adult 1 Each (50 mL/Hr) TPN Continuous <Continuous>    MEDICATIONS  (PRN):  benzocaine 15 mG/menthol 3.6 mG Lozenge 1 Lozenge Oral every 2 hours PRN Sore Throat  benzocaine 20% Spray 1 Spray(s) Topical once PRN NGT discomfort  ondansetron Injectable 4 milliGRAM(s) IV Push every 3 hours PRN Nausea and/or Vomiting  sodium chloride 0.9% lock flush 10 milliLiter(s) IV Push every 1 hour PRN Pre/post blood products, medications, blood draw, and to maintain line patency

## 2022-12-27 NOTE — PROGRESS NOTE ADULT - SUBJECTIVE AND OBJECTIVE BOX
see resident note for full details.  patient with sbo 12/15-20.  okay for 3 days and symptoms recurred requiring admission here in [facility].  Patient currently with 1200 cc / day and some output from stoma.  +abd cramping pain with contrast.    case d/w pt and  at length.  persistent partial obstruction.  given options of operate, CT or pull ngt patient opts for operation give persistent symptoms.    r/b/a d/w pt at length  case d/w her colorectal surgeon at [facility].  he agrees with surgical plan  for dx lap, MILANA poss laparotomy

## 2022-12-27 NOTE — DIETITIAN INITIAL EVALUATION ADULT - OTHER INFO
43yo Female pt with PMHx Rectal cancer (Stage IIIC) s/p neoadjuvant chemorads (4 doses of chemotherapy last one 08/19; 27 radiation sessions); PSHx APR (09/2022 @ [facility]). Presented to [facility] with c/o abdominal pain for 1 day. She indicates that she was doing well, until this am, when she started developing crampy abdominal pain around her whole abdomen, nonlocalized. Pain was a/w nausea, bilious emesis. She also noted no output from her ostomy for the past 24 hours. Of note she was recently admitted (last month) at an OSH in [place] with the same symptoms, and was told that she had a SBO, managed nonop with NGT decompression, and was subsequently discharged with adequate bowel function and tolerating a regular diet, and was doing well until this am. Denies fever, chills, SOB, blood per ostomy. Last colonoscopy - in february when dx with rectal cancer / Serial flexible sigmoidoscopies since diagnosis for surgical planning / EGD - never. Maternal and paternal grandmothers dx with colon cancer; paternal great grandfather dx with gastric cancer. Surgery consulted for SBO. In the ED, pt afebrile, nontachycardic, normotensive, and satting on RA. Labs WBC 6.3, H/H 14.3/42.8, , rest of chemisty wnl. CT A/P with findings consistent with Distal small bowel obstruction with transition point in the left mid abdomen adjacent to the narrowed distal segment of the descending colon just proximal to the colostomy. An NGT was placed with immediate return of 500cc of clear fluid with food content, patient feeling better after placement.    Pt seen on 9UR at bedside. Pt's family ( and son) was at pt's bedside. Appetite currently per pt; PTA, appetite was blank per pt. As per diet/24h recall, PTA pt consumed several meals per day, which consisted of eggs, salmon, nuts, whole wheat products, raw kale, peas, potatoes, not many sources of carbohydrates, fresh fruits/vegetables, tofu products, low to zero amounts of salt and sugar. During current admission, pt is NPO status. Pt has been started on TPN: 150g Dextrose, 80g amino acids, 50g SMOF lipids. Preferences: pt follows a mainly plant-based diet/lifestyle, with the exception of eggs and fish at times. No cultural, ethnic, [employer] food preferences noted. GI: s/s abdominal discomfort; pt endorsed she passed gas this morning. No c/o N/V. Pt has an ostomy bag. Colostomy output 2mL. NGT output 1225mL. Ag: 20. Skin integrity: colstomy in place and R anterior chest chemoport in place. No edema noted. During interview, pt stated she had some pain to her L arm. RN aware. NKFA. No issues chewing/swallowing noted. Pertinent labs: fluctuating POCT BG values (most recent 187, 52); will monitor trends. Pertinent nutrition-related medications/supplements: pt is receiving zofran and IVF (D5 + LR). Pt stated UBW ~155 pounds, which is not consistent with wt upon admission (~140 pounds). Pt stated she had weight fluctuations since the start of her chemotherapy/treatments. Pt stated her weight loss started in June, and she lost ~10 pounds; pt stated she has been ~135-140# x3 months. This ~10% wt loss in the past 6 months is clinically significant. Pt's IBW is 135#, pt is 104% of IBW. Dietitian conducted nutrition focused physical exam: during NFPE, pt exhibited mild temporal muscle wasting. Based on pt's diet recall, pt suspected to have been consuming <75% of estimated energy requirements/needs for the past 3-6 months. Based on ASPEN guidelines, pt meets the criteria for moderate protein calorie malnutrition in the context of chronic illness. Pt amenable to education; RD provided education in regards to the importance of adequate macro and micronutrients, as well as hydration to support ADLs, maintain energy levels and overall functional/nutritional status. RD discussed importance of TPN as an alternate means of nutrition with pt and pt's family at bedside. Pt was receptive and verbalized understanding. No additional nutrition-related concerns. RD will remain available per protocol. Additional nutrition recommendations listed below to follow.

## 2022-12-27 NOTE — BRIEF OPERATIVE NOTE - OPERATION/FINDINGS
patient was intubated prepped and draped. Site was marked in RUQ. abdomen entered via trochar visualization. Two additional ports placed under direct visualization. abdomen examined. bowel run. Immobile portion of jejunum encountered in RLQ retroperitoneum. Observed to be transition point. Adhesion lysed. bowel became freely mobile. No other adhesions observed. hemostasis achieved. Ports removed. skin closed, covered with dermabond. patient was intubated prepped and draped. Site was marked in RUQ. abdomen entered via trochar visualization. Two additional ports placed under direct visualization. abdomen examined. bowel run. Immobile portion of jejunum encountered in RLQ retroperitoneum. Observed to be transition point. Adhesion lysed. bowel became freely mobile. No other adhesions observed. ngt observed in stomach. hemostasis achieved. Ports removed. skin closed, covered with dermabond.

## 2022-12-27 NOTE — DIETITIAN INITIAL EVALUATION ADULT - PERTINENT LABORATORY DATA
12-27    140  |  107  |  12  ----------------------------<  62<L>  4.0   |  16<L>  |  0.57    Ca    8.7      27 Dec 2022 05:43  Phos  3.7     12-27  Mg     1.8     12-27    POCT Blood Glucose.: 187 mg/dL (12-27-22 @ 08:57)

## 2022-12-27 NOTE — PROGRESS NOTE ADULT - SUBJECTIVE AND OBJECTIVE BOX
POC     STATUS POST: dx lap MILANA    SUBJECTIVE: Pt seen in the PACU. Pt doing well in good spirits. Denies any chest pain, SOB, or leg pain. NGT in place. Abdominal soreness is well controlled.    Vital Signs Last 24 Hrs  T(C): 36.7 (27 Dec 2022 19:37), Max: 36.7 (27 Dec 2022 19:37)  T(F): 98.1 (27 Dec 2022 19:37), Max: 98.1 (27 Dec 2022 19:37)  HR: 57 (27 Dec 2022 19:37) (51 - 78)  BP: 102/59 (27 Dec 2022 19:37) (102/59 - 152/70)  BP(mean): 75 (27 Dec 2022 19:37) (75 - 100)  RR: 15 (27 Dec 2022 19:37) (10 - 19)  SpO2: 96% (27 Dec 2022 19:37) (94% - 97%)    Parameters below as of 27 Dec 2022 19:37  Patient On (Oxygen Delivery Method): room air      I&O's Summary    26 Dec 2022 07:01  -  27 Dec 2022 07:00  --------------------------------------------------------  IN: 2640 mL / OUT: 3027 mL / NET: -387 mL    27 Dec 2022 07:01  -  27 Dec 2022 20:54  --------------------------------------------------------  IN: 650 mL / OUT: 850 mL / NET: -200 mL      I&O's Detail    26 Dec 2022 07:01  -  27 Dec 2022 07:00  --------------------------------------------------------  IN:    Lactated Ringers: 2640 mL  Total IN: 2640 mL    OUT:    Colostomy (mL): 2 mL    Nasogastric/Oral tube (mL): 1225 mL    Oral Fluid: 0 mL    Voided (mL): 1800 mL  Total OUT: 3027 mL    Total NET: -387 mL      27 Dec 2022 07:01  -  27 Dec 2022 20:54  --------------------------------------------------------  IN:    dextrose 5% + lactated ringers: 650 mL  Total IN: 650 mL    OUT:    Colostomy (mL): 0 mL    Nasogastric/Oral tube (mL): 300 mL    Oral Fluid: 0 mL    Voided (mL): 550 mL  Total OUT: 850 mL    Total NET: -200 mL            LABS:                        11.1   3.69  )-----------( 237      ( 27 Dec 2022 05:43 )             35.3     12-27    140  |  107  |  12  ----------------------------<  62<L>  4.0   |  16<L>  |  0.57    Ca    8.7      27 Dec 2022 05:43  Phos  3.7     12-27  Mg     1.8     12-27      PT/INR - ( 27 Dec 2022 12:00 )   PT: 13.2 sec;   INR: 1.11          PTT - ( 27 Dec 2022 12:00 )  PTT:36.8 sec      Physical exam :  Neuro: Alert and Oriented X 4. No apparent focal neural deficits  HEENT: NCAT. Mucous membranes moist. EOMI  Respiratory:  No respiratory distress  Cardiovascular: Non tachy/dottie  Gastrointestinal: soft, mildly distneded/NT. No rebound/guarding                 Incision: laparoscopic port sites c/d/i  Extremities: (-) edema b/l. SCDs in place.

## 2022-12-28 LAB
ANION GAP SERPL CALC-SCNC: 11 MMOL/L — SIGNIFICANT CHANGE UP (ref 5–17)
BUN SERPL-MCNC: 9 MG/DL — SIGNIFICANT CHANGE UP (ref 7–23)
CALCIUM SERPL-MCNC: 8.4 MG/DL — SIGNIFICANT CHANGE UP (ref 8.4–10.5)
CHLORIDE SERPL-SCNC: 107 MMOL/L — SIGNIFICANT CHANGE UP (ref 96–108)
CO2 SERPL-SCNC: 22 MMOL/L — SIGNIFICANT CHANGE UP (ref 22–31)
CREAT SERPL-MCNC: 0.6 MG/DL — SIGNIFICANT CHANGE UP (ref 0.5–1.3)
EGFR: 113 ML/MIN/1.73M2 — SIGNIFICANT CHANGE UP
GLUCOSE SERPL-MCNC: 140 MG/DL — HIGH (ref 70–99)
HCT VFR BLD CALC: 35.1 % — SIGNIFICANT CHANGE UP (ref 34.5–45)
HGB BLD-MCNC: 11.6 G/DL — SIGNIFICANT CHANGE UP (ref 11.5–15.5)
MAGNESIUM SERPL-MCNC: 1.9 MG/DL — SIGNIFICANT CHANGE UP (ref 1.6–2.6)
MCHC RBC-ENTMCNC: 29.4 PG — SIGNIFICANT CHANGE UP (ref 27–34)
MCHC RBC-ENTMCNC: 33 GM/DL — SIGNIFICANT CHANGE UP (ref 32–36)
MCV RBC AUTO: 89.1 FL — SIGNIFICANT CHANGE UP (ref 80–100)
NRBC # BLD: 0 /100 WBCS — SIGNIFICANT CHANGE UP (ref 0–0)
PHOSPHATE SERPL-MCNC: 3.2 MG/DL — SIGNIFICANT CHANGE UP (ref 2.5–4.5)
PLATELET # BLD AUTO: 252 K/UL — SIGNIFICANT CHANGE UP (ref 150–400)
POTASSIUM SERPL-MCNC: 4 MMOL/L — SIGNIFICANT CHANGE UP (ref 3.5–5.3)
POTASSIUM SERPL-SCNC: 4 MMOL/L — SIGNIFICANT CHANGE UP (ref 3.5–5.3)
RBC # BLD: 3.94 M/UL — SIGNIFICANT CHANGE UP (ref 3.8–5.2)
RBC # FLD: 11.9 % — SIGNIFICANT CHANGE UP (ref 10.3–14.5)
SODIUM SERPL-SCNC: 140 MMOL/L — SIGNIFICANT CHANGE UP (ref 135–145)
WBC # BLD: 5.8 K/UL — SIGNIFICANT CHANGE UP (ref 3.8–10.5)
WBC # FLD AUTO: 5.8 K/UL — SIGNIFICANT CHANGE UP (ref 3.8–10.5)

## 2022-12-28 RX ORDER — ELECTROLYTE SOLUTION,INJ
1 VIAL (ML) INTRAVENOUS
Refills: 0 | Status: DISCONTINUED | OUTPATIENT
Start: 2022-12-28 | End: 2022-12-28

## 2022-12-28 RX ORDER — ACETAMINOPHEN 500 MG
1000 TABLET ORAL ONCE
Refills: 0 | Status: COMPLETED | OUTPATIENT
Start: 2022-12-28 | End: 2022-12-28

## 2022-12-28 RX ORDER — I.V. FAT EMULSION 20 G/100ML
0.79 EMULSION INTRAVENOUS
Qty: 50 | Refills: 0 | Status: DISCONTINUED | OUTPATIENT
Start: 2022-12-28 | End: 2022-12-28

## 2022-12-28 RX ORDER — MAGNESIUM SULFATE 500 MG/ML
1 VIAL (ML) INJECTION ONCE
Refills: 0 | Status: DISCONTINUED | OUTPATIENT
Start: 2022-12-28 | End: 2022-12-28

## 2022-12-28 RX ADMIN — SODIUM CHLORIDE 100 MILLILITER(S): 9 INJECTION, SOLUTION INTRAVENOUS at 22:31

## 2022-12-28 RX ADMIN — HEPARIN SODIUM 5000 UNIT(S): 5000 INJECTION INTRAVENOUS; SUBCUTANEOUS at 21:27

## 2022-12-28 RX ADMIN — Medication 400 MILLIGRAM(S): at 22:32

## 2022-12-28 RX ADMIN — HEPARIN SODIUM 5000 UNIT(S): 5000 INJECTION INTRAVENOUS; SUBCUTANEOUS at 06:32

## 2022-12-28 RX ADMIN — I.V. FAT EMULSION 20.83 GM/KG/DAY: 20 EMULSION INTRAVENOUS at 18:47

## 2022-12-28 RX ADMIN — HEPARIN SODIUM 5000 UNIT(S): 5000 INJECTION INTRAVENOUS; SUBCUTANEOUS at 15:05

## 2022-12-28 RX ADMIN — Medication 1000 MILLIGRAM(S): at 23:00

## 2022-12-28 RX ADMIN — SODIUM CHLORIDE 100 MILLILITER(S): 9 INJECTION, SOLUTION INTRAVENOUS at 06:33

## 2022-12-28 RX ADMIN — Medication 1 EACH: at 18:48

## 2022-12-28 RX ADMIN — SODIUM CHLORIDE 100 MILLILITER(S): 9 INJECTION, SOLUTION INTRAVENOUS at 13:04

## 2022-12-28 RX ADMIN — CHLORHEXIDINE GLUCONATE 1 APPLICATION(S): 213 SOLUTION TOPICAL at 06:32

## 2022-12-28 NOTE — PROGRESS NOTE ADULT - SUBJECTIVE AND OBJECTIVE BOX
SUBJECTIVE:  Patient seen and examined on AM rounds with chief resident. No acute events overnight. Patient this morning is in good spirits. She denies nausea, vomiting, fever, and chills. Ambulating without issue. Pending return of bowel function.     MEDICATIONS  (STANDING):  chlorhexidine 2% Cloths 1 Application(s) Topical <User Schedule>  dextrose 10% Bolus 250 milliLiter(s) IV Bolus once  dextrose 5% + lactated ringers. 1000 milliLiter(s) (100 mL/Hr) IV Continuous <Continuous>  fat emulsion (Fish Oil and Plant Based) 20% Infusion 0.7874 Gm/kG/Day (20.83 mL/Hr) IV Continuous <Continuous>  heparin   Injectable 5000 Unit(s) SubCutaneous every 8 hours  influenza   Vaccine 0.5 milliLiter(s) IntraMuscular once  Parenteral Nutrition - Adult 1 Each (50 mL/Hr) TPN Continuous <Continuous>    MEDICATIONS  (PRN):  benzocaine 15 mG/menthol 3.6 mG Lozenge 1 Lozenge Oral every 2 hours PRN Sore Throat  benzocaine 20% Spray 1 Spray(s) Topical once PRN NGT discomfort  HYDROmorphone  Injectable 0.25 milliGRAM(s) IV Push once PRN Severe Pain (7 - 10)  ondansetron Injectable 4 milliGRAM(s) IV Push every 3 hours PRN Nausea and/or Vomiting  sodium chloride 0.9% lock flush 10 milliLiter(s) IV Push every 1 hour PRN Pre/post blood products, medications, blood draw, and to maintain line patency    Vital Signs Last 24 Hrs  T(C): 36.8 (28 Dec 2022 04:35), Max: 37.2 (27 Dec 2022 20:55)  T(F): 98.2 (28 Dec 2022 04:35), Max: 98.9 (27 Dec 2022 20:55)  HR: 85 (28 Dec 2022 04:35) (51 - 85)  BP: 100/61 (28 Dec 2022 04:35) (100/61 - 152/70)  BP(mean): 75 (27 Dec 2022 19:37) (75 - 100)  RR: 17 (28 Dec 2022 04:35) (10 - 19)  SpO2: 96% (28 Dec 2022 04:35) (94% - 97%)    Parameters below as of 28 Dec 2022 04:35  Patient On (Oxygen Delivery Method): room air    Physical Exam:  General: NAD, resting comfortably in bed  Pulmonary: Nonlabored breathing, no respiratory distress  Cardiovascular: NSR  Abdominal: soft, NT/ND, no sweat or stool in ostomy bag  Extremities: WWP, normal strength    I&O's Summary    27 Dec 2022 07:01  -  28 Dec 2022 07:00  --------------------------------------------------------  IN: 2428.8 mL / OUT: 1700 mL / NET: 728.8 mL        LABS:                        11.6   5.80  )-----------( 252      ( 28 Dec 2022 05:30 )             35.1     12-28    140  |  107  |  9   ----------------------------<  140<H>  4.0   |  22  |  0.60    Ca    8.4      28 Dec 2022 05:30  Phos  3.2     12-28  Mg     1.9     12-28      PT/INR - ( 27 Dec 2022 12:00 )   PT: 13.2 sec;   INR: 1.11          PTT - ( 27 Dec 2022 12:00 )  PTT:36.8 sec    CAPILLARY BLOOD GLUCOSE      POCT Blood Glucose.: 187 mg/dL (27 Dec 2022 08:57)  POCT Blood Glucose.: 52 mg/dL (27 Dec 2022 08:04)

## 2022-12-29 VITALS
OXYGEN SATURATION: 99 % | HEART RATE: 70 BPM | DIASTOLIC BLOOD PRESSURE: 69 MMHG | RESPIRATION RATE: 18 BRPM | SYSTOLIC BLOOD PRESSURE: 108 MMHG | TEMPERATURE: 98 F

## 2022-12-29 LAB
ANION GAP SERPL CALC-SCNC: 9 MMOL/L — SIGNIFICANT CHANGE UP (ref 5–17)
BUN SERPL-MCNC: 10 MG/DL — SIGNIFICANT CHANGE UP (ref 7–23)
CALCIUM SERPL-MCNC: 8.6 MG/DL — SIGNIFICANT CHANGE UP (ref 8.4–10.5)
CHLORIDE SERPL-SCNC: 109 MMOL/L — HIGH (ref 96–108)
CO2 SERPL-SCNC: 27 MMOL/L — SIGNIFICANT CHANGE UP (ref 22–31)
CREAT SERPL-MCNC: 0.54 MG/DL — SIGNIFICANT CHANGE UP (ref 0.5–1.3)
EGFR: 116 ML/MIN/1.73M2 — SIGNIFICANT CHANGE UP
GLUCOSE SERPL-MCNC: 121 MG/DL — HIGH (ref 70–99)
HCT VFR BLD CALC: 33.7 % — LOW (ref 34.5–45)
HGB BLD-MCNC: 11.2 G/DL — LOW (ref 11.5–15.5)
MAGNESIUM SERPL-MCNC: 2 MG/DL — SIGNIFICANT CHANGE UP (ref 1.6–2.6)
MCHC RBC-ENTMCNC: 29.6 PG — SIGNIFICANT CHANGE UP (ref 27–34)
MCHC RBC-ENTMCNC: 33.2 GM/DL — SIGNIFICANT CHANGE UP (ref 32–36)
MCV RBC AUTO: 89.2 FL — SIGNIFICANT CHANGE UP (ref 80–100)
NRBC # BLD: 0 /100 WBCS — SIGNIFICANT CHANGE UP (ref 0–0)
PHOSPHATE SERPL-MCNC: 4.1 MG/DL — SIGNIFICANT CHANGE UP (ref 2.5–4.5)
PLATELET # BLD AUTO: 231 K/UL — SIGNIFICANT CHANGE UP (ref 150–400)
POTASSIUM SERPL-MCNC: 3.6 MMOL/L — SIGNIFICANT CHANGE UP (ref 3.5–5.3)
POTASSIUM SERPL-SCNC: 3.6 MMOL/L — SIGNIFICANT CHANGE UP (ref 3.5–5.3)
RBC # BLD: 3.78 M/UL — LOW (ref 3.8–5.2)
RBC # FLD: 12.1 % — SIGNIFICANT CHANGE UP (ref 10.3–14.5)
SODIUM SERPL-SCNC: 145 MMOL/L — SIGNIFICANT CHANGE UP (ref 135–145)
WBC # BLD: 3.49 K/UL — LOW (ref 3.8–10.5)
WBC # FLD AUTO: 3.49 K/UL — LOW (ref 3.8–10.5)

## 2022-12-29 PROCEDURE — 96375 TX/PRO/DX INJ NEW DRUG ADDON: CPT

## 2022-12-29 PROCEDURE — 71045 X-RAY EXAM CHEST 1 VIEW: CPT

## 2022-12-29 PROCEDURE — 83735 ASSAY OF MAGNESIUM: CPT

## 2022-12-29 PROCEDURE — 85730 THROMBOPLASTIN TIME PARTIAL: CPT

## 2022-12-29 PROCEDURE — 36415 COLL VENOUS BLD VENIPUNCTURE: CPT

## 2022-12-29 PROCEDURE — 83690 ASSAY OF LIPASE: CPT

## 2022-12-29 PROCEDURE — 86900 BLOOD TYPING SEROLOGIC ABO: CPT

## 2022-12-29 PROCEDURE — 80053 COMPREHEN METABOLIC PANEL: CPT

## 2022-12-29 PROCEDURE — 96376 TX/PRO/DX INJ SAME DRUG ADON: CPT

## 2022-12-29 PROCEDURE — 84100 ASSAY OF PHOSPHORUS: CPT

## 2022-12-29 PROCEDURE — 85025 COMPLETE CBC W/AUTO DIFF WBC: CPT

## 2022-12-29 PROCEDURE — 74018 RADEX ABDOMEN 1 VIEW: CPT

## 2022-12-29 PROCEDURE — 74019 RADEX ABDOMEN 2 VIEWS: CPT

## 2022-12-29 PROCEDURE — 74177 CT ABD & PELVIS W/CONTRAST: CPT | Mod: MA

## 2022-12-29 PROCEDURE — U0003: CPT

## 2022-12-29 PROCEDURE — 96374 THER/PROPH/DIAG INJ IV PUSH: CPT

## 2022-12-29 PROCEDURE — 85610 PROTHROMBIN TIME: CPT

## 2022-12-29 PROCEDURE — 82962 GLUCOSE BLOOD TEST: CPT

## 2022-12-29 PROCEDURE — 87635 SARS-COV-2 COVID-19 AMP PRB: CPT

## 2022-12-29 PROCEDURE — 99285 EMERGENCY DEPT VISIT HI MDM: CPT | Mod: 25

## 2022-12-29 PROCEDURE — 84702 CHORIONIC GONADOTROPIN TEST: CPT

## 2022-12-29 PROCEDURE — 85027 COMPLETE CBC AUTOMATED: CPT

## 2022-12-29 PROCEDURE — 86901 BLOOD TYPING SEROLOGIC RH(D): CPT

## 2022-12-29 PROCEDURE — 80048 BASIC METABOLIC PNL TOTAL CA: CPT

## 2022-12-29 PROCEDURE — U0005: CPT

## 2022-12-29 PROCEDURE — 36573 INSJ PICC RS&I 5 YR+: CPT

## 2022-12-29 PROCEDURE — 86850 RBC ANTIBODY SCREEN: CPT

## 2022-12-29 RX ORDER — ACETAMINOPHEN 500 MG
2 TABLET ORAL
Qty: 56 | Refills: 0
Start: 2022-12-29 | End: 2023-01-04

## 2022-12-29 RX ORDER — POTASSIUM CHLORIDE 20 MEQ
40 PACKET (EA) ORAL ONCE
Refills: 0 | Status: COMPLETED | OUTPATIENT
Start: 2022-12-29 | End: 2022-12-29

## 2022-12-29 RX ORDER — ACETAMINOPHEN 500 MG
1000 TABLET ORAL ONCE
Refills: 0 | Status: DISCONTINUED | OUTPATIENT
Start: 2022-12-29 | End: 2022-12-29

## 2022-12-29 RX ADMIN — Medication 40 MILLIEQUIVALENT(S): at 09:11

## 2022-12-29 RX ADMIN — HEPARIN SODIUM 5000 UNIT(S): 5000 INJECTION INTRAVENOUS; SUBCUTANEOUS at 05:31

## 2022-12-29 RX ADMIN — CHLORHEXIDINE GLUCONATE 1 APPLICATION(S): 213 SOLUTION TOPICAL at 06:14

## 2022-12-29 NOTE — DISCHARGE NOTE NURSING/CASE MANAGEMENT/SOCIAL WORK - PATIENT PORTAL LINK FT
You can access the FollowMyHealth Patient Portal offered by [facility] by registering at the following website: [email or web address]. By joining [facility]’s FollowMyHealth portal, you will also be able to view your health information using other applications (apps) compatible with our system.

## 2022-12-29 NOTE — DISCHARGE NOTE PROVIDER - NSDCFUADDINST_GEN_ALL_CORE_FT
Please follow up with Dr. Vang in one week; you may call the office to make an appointment at your earliest convenience.    General Discharge Instructions:  Please resume all regular home medications unless specifically advised not to take a particular medication. Also, please take any new medications as prescribed.  Please get plenty of rest, continue to ambulate several times per day, and drink adequate amounts of fluids. Avoid lifting weights greater than 5-10 lbs until you follow-up with your surgeon, who will instruct you further regarding activity restrictions.  Avoid driving or operating heavy machinery while taking pain medications.  Please follow-up with your surgeon and Primary Care Provider (PCP) as advised.  Incision Care:  *Please call your doctor if you have increased pain, swelling, redness, or drainage from the incision site.  *Avoid swimming and baths until your follow-up appointment.  *You may shower, and wash surgical incisions with a mild soap and warm water. Gently pat the area dry.    Warning Signs:  Please call your doctor if you experience the following:  *You experience new chest pain, pressure, squeezing or tightness.  *New or worsening cough, shortness of breath, or wheeze.  *If you are vomiting and cannot keep down fluids or your medications.  *You are getting dehydrated due to continued vomiting, diarrhea, or other reasons. Signs of dehydration include dry mouth, rapid heartbeat, or feeling dizzy or faint when standing.  *You see blood or dark/black material when you vomit or have a bowel movement.  *You experience burning when you urinate, have blood in your urine, or experience a discharge.  *Your pain is not improving within 8-12 hours or is not gone within 24 hours. Call or return immediately if your pain is getting worse, changes location, or moves to your chest or back.  *You have shaking chills, or fever greater than 101.5 degrees Fahrenheit or 38 degrees Celsius.  *Any change in your symptoms, or any new symptoms that concern you.

## 2022-12-29 NOTE — DISCHARGE NOTE NURSING/CASE MANAGEMENT/SOCIAL WORK - NSDCPEFALRISK_GEN_ALL_CORE
For information on Fall & Injury Prevention, visit: [email or web address] OR  [email or web address] OR  https://www.cdc.gov/steadi/patient.html

## 2022-12-29 NOTE — DISCHARGE NOTE PROVIDER - NSDCMRMEDTOKEN_GEN_ALL_CORE_FT
Adapt Barrier Rings #8805: Adapt Barrier Rings #8805 - 3 month supply  Adapt Ostomy Belt (Medium) #7300: Adapt Ostomy Belt (Medium) #7300 - 3 month supply  Adapt Stoma Powder #7906: Adapt Stoma Powder #7906 - 3 month supply  Brava Adhesive Remover : Brava Adhesive Remover - 3 month supply  Enterprise Drainable Pouches 1.75&quot; #87037: Iza Drainable Pouches 1.75&quot; #28324 - 3 month supply  Iza Drainable Pouches 2.25&quot; #98891: Enterprise Drainable Pouches 2.25&quot; #54681 - 3 month supply  Iza Drainable Pouches 2.75&quot; #41401: Enterprise Drainable Pouches 2.75&quot; #35941 - 3 month supply  Iza Skin Barrier (Convex) 2.25&quot; #59273: Enterprise Skin Barrier (Convex) 2.25&quot; #11463  Iza Skin Barriers (Convex) 1.75&quot; #17524: Enterprise Skin Barriers (Convex) 1.75&quot; #36207 - 3 month supply  Enterprise Skin Barriers (Convex) 2.75&quot; : Iza Skin Barriers (Convex) 2.75&quot; - #21434 3 month supply  Skin Prep #7917: Skin Prep #7917 3 month supply  Tylenol Extra Strength 500 mg oral tablet: 2 tab(s) orally every 6 hours, As Needed -for moderate pain MDD:1000mg

## 2022-12-29 NOTE — DISCHARGE NOTE PROVIDER - CARE PROVIDER_API CALL
Vincent Vang)  ColonRectal Surgery; Surgery  [address], [address]  [place] [address]  Phone: [phone]  Fax: [phone]  Follow Up Time:

## 2022-12-29 NOTE — DISCHARGE NOTE PROVIDER - DETAILS OF MALNUTRITION DIAGNOSIS/DIAGNOSES
This patient has been assessed with a concern for Malnutrition and was treated during this hospitalization for the following Nutrition diagnosis/diagnoses:     -  12/27/2022: Moderate protein-calorie malnutrition

## 2022-12-29 NOTE — DISCHARGE NOTE PROVIDER - NSDCACTIVITY_GEN_ALL_CORE
Return to Work/School allowed/Showering allowed/Stairs allowed/Walking - Indoors allowed/Walking - Outdoors allowed/Follow Instructions Provided by your Surgical Team

## 2022-12-29 NOTE — DISCHARGE NOTE PROVIDER - HOSPITAL COURSE
43yo Female pt with PMHx Rectal cancer (Stage IIIC) s/p neoadjuvant chemorads (4 doses of chemotherapy last one 08/19; 27 radiation sessions); PSHx APR (09/2022 @ [facility]) presented to [facility] c/o abdominal pain for 1 day. In the ED, labs WBC 6.3, H/H 14.3/42.8, , rest of chemisty wnl. CT A/P with findings consistent with Distal small bowel obstruction with transition point in the left mid abdomen adjacent to the narrowed distal segment of the descending colon just proximal to the colostomy. An NGT was placed with immediate return of 500cc of clear fluid with food content. Pt failed non-op management, placed on TPN, and was taken to the OR on 12/27 for diagnostic lap and lysis of adhesions. On POD2 pt had return of bowel function, NGT was removed, TPN was dc'd, diet was advanced and tolerated. On day of discharge pt was stable for dc home.

## 2022-12-29 NOTE — PROGRESS NOTE ADULT - ASSESSMENT
42 F PMHx Stage IIIC Rectal Ca s/p neoadjuvant chemorads, s/p APR 9/22 admitted with SBO.    s/p Nunu  NPO/IVF, PICC/ TPN  NGT to DANIEL  JOYCELYN  SQH/SCDs/OOBA/IS  AM Labs  Dispo: home  
41yo Female pt with PMHx rectal ca (s/p chemo/XRT, last chemo session 8/19; colostomy on 9/26) presented to [facility] with c/o abdominal pain, n/v, reduced ostomy output for 1 day found with SBO    Plan  #SBO  -managed as per surgery  -NGT in place w/ bilious drainage  -NPO  -Serial abdominal exams    #Rectal cancer  -s/p chemo    #Normocytic anemia  -no i/o active hemorrhage  -c/w monitoring CBC qd    #DVT ppx as per primary team
42 F PMHx Stage IIIC Rectal Ca s/p neoadjuvant chemorads, s/p APR 9/22 admitted with SBO.    NPO/IVF, place PICC for start TPN  NGT to LIWS  Gastrograffin via NGT with an ABD XR following   JOYCELYN  SQH/SCDs/OOBA/IS  AM Labs  Dispo: home
41yo Female pt with PMHx Rectal cancer (Stage IIIC) s/p neoadjuvant chemorads (4 doses of chemotherapy last one 08/19; 27 radiation sessions); PSHx APR (09/2022 @ [facility]). Admitted with SBO    NPO/IVF  NGT to LIWS  Monitor BF  JOYCELYN  SQH/SCDs/OOBA/IS  Am labs    
42 F PMHx Stage IIIC Rectal Ca s/p neoadjuvant chemorads, s/p APR 9/22 admitted with SBO.    NPO/IVF  NGT to DANIEL  JOYCELYN  SQH/SCDs/OOBA/IS  AM Labs  Dispo: home  
42 F PMHx Stage IIIC Rectal Ca s/p neoadjuvant chemorads, s/p APR 9/22 admitted with SBO.    NPO/IVF  NGT to LIWS - flush regularly   JOYCELYN  SQH/SCDs/OOBA/IS  AM Labs  Monitor for ostomy function  Dispo: home  
A/P: 42y Female s/p dx laparoscopy with Nunu  - Diet: NPO/IVF  - NGT - LIWS  - Activity: as toleratd  - Labs: AM labs  - Pain medication: PRN  - DVT ppx    Patient stable to go to regional floors.    
42 F PMHx Stage IIIC Rectal Ca s/p neoadjuvant chemorads, s/p APR 9/22 admitted with SBO s/p MILANA on 12/27    d/c NGT  Advance to CLD, wean TPN if tolerating diet  PICC  JOYCELYN  SQH/SCDs/OOBA/IS  AM Labs  Dispo: home

## 2022-12-29 NOTE — ADVANCED PRACTICE NURSE CONSULT - ASSESSMENT
42 F PMHx Stage IIIC Rectal Ca s/p neoadjuvant chemorads, s/p APR 9/22 admitted with SBO s/p MILANA on 12/27. Pt with questions about which appliance to use once she is discharged. Stoma pink and currently protruding but patient states that it becomes flush to skin frequently, which makes it hard to pouch. [place] 2 1/4" Iza convex skin barrier applied and information given to patient on product numbers, etc. Pt knowledgeable of ordering supplies. Extra supplies given to patient in multiple sizes for discharge. Dark brown liquid effluent noted in old pouch.

## 2022-12-29 NOTE — PROGRESS NOTE ADULT - NUTRITIONAL ASSESSMENT
This patient has been assessed with a concern for Malnutrition and has been determined to have a diagnosis/diagnoses of Moderate protein-calorie malnutrition.    This patient is being managed with:   Parenteral Nutrition - Adult-  Entered: Dec 28 2022  5:00PM    fat emulsion (Fish Oil and Plant Based) 20% Infusion-[Known as SMOFLIPID 20% Infusion]  50 Gram(s) in IV Solution 250 milliLiter(s) infuse at 20.83 mL/Hr  Dose Rate: 0.7874 Gm/kG/Day Infuse Over: 12 Hours; Stop After 12 Hours  Administration Instructions: Use 1.2 micron in-line filter  Entered: Dec 28 2022  5:00PM    Diet NPO-  Entered: Dec 23 2022  5:10PM    
This patient has been assessed with a concern for Malnutrition and has been determined to have a diagnosis/diagnoses of Moderate protein-calorie malnutrition.    This patient is being managed with:   Parenteral Nutrition - Adult-  Entered: Dec 27 2022  5:00PM    fat emulsion (Fish Oil and Plant Based) 20% Infusion-[Known as SMOFLIPID 20% Infusion]  50 Gram(s) in IV Solution 250 milliLiter(s) infuse at 20.83 mL/Hr  Dose Rate: 0.7874 Gm/kG/Day Infuse Over: 12 Hours; Stop After 12 Hours  Administration Instructions: Use 1.2 micron in-line filter  Entered: Dec 27 2022  5:00PM    Diet NPO-  Entered: Dec 23 2022  5:10PM    

## 2022-12-29 NOTE — PROGRESS NOTE ADULT - SUBJECTIVE AND OBJECTIVE BOX
SUBJECTIVE:  Patient seen and examined on AM rounds. No acute events overnight. Patient is in good spirits today. Reports some abdominal pain, but controlled with pain medications. Denies nausea, vomiting, fever, and chills. Has return of bowel function. Voiding without issue. Walking around the unit.     MEDICATIONS  (STANDING):  chlorhexidine 2% Cloths 1 Application(s) Topical <User Schedule>  dextrose 10% Bolus 250 milliLiter(s) IV Bolus once  dextrose 5% + lactated ringers. 1000 milliLiter(s) (100 mL/Hr) IV Continuous <Continuous>  fat emulsion (Fish Oil and Plant Based) 20% Infusion 0.7874 Gm/kG/Day (20.83 mL/Hr) IV Continuous <Continuous>  heparin   Injectable 5000 Unit(s) SubCutaneous every 8 hours  influenza   Vaccine 0.5 milliLiter(s) IntraMuscular once  Parenteral Nutrition - Adult 1 Each (50 mL/Hr) TPN Continuous <Continuous>    MEDICATIONS  (PRN):  benzocaine 15 mG/menthol 3.6 mG Lozenge 1 Lozenge Oral every 2 hours PRN Sore Throat  benzocaine 20% Spray 1 Spray(s) Topical once PRN NGT discomfort  HYDROmorphone  Injectable 0.25 milliGRAM(s) IV Push once PRN Severe Pain (7 - 10)  ondansetron Injectable 4 milliGRAM(s) IV Push every 3 hours PRN Nausea and/or Vomiting  sodium chloride 0.9% lock flush 10 milliLiter(s) IV Push every 1 hour PRN Pre/post blood products, medications, blood draw, and to maintain line patency      Vital Signs Last 24 Hrs  T(C): 36.2 (29 Dec 2022 05:38), Max: 37.2 (29 Dec 2022 00:21)  T(F): 97.1 (29 Dec 2022 05:38), Max: 98.9 (29 Dec 2022 00:21)  HR: 64 (29 Dec 2022 05:38) (64 - 81)  BP: 100/63 (29 Dec 2022 05:38) (100/55 - 129/70)  BP(mean): --  RR: 16 (29 Dec 2022 05:38) (16 - 17)  SpO2: 97% (29 Dec 2022 05:38) (94% - 99%)    Parameters below as of 29 Dec 2022 05:38  Patient On (Oxygen Delivery Method): room air    Physical Exam:  General: NAD, resting comfortably in bed  Pulmonary: Nonlabored breathing, no respiratory distress  Cardiovascular: NSR  Abdominal: soft, NT/ND  Extremities: WWP, normal strength    I&O's Summary    28 Dec 2022 07:01  -  29 Dec 2022 07:00  --------------------------------------------------------  IN: 3795.4 mL / OUT: 1925 mL / NET: 1870.4 mL        LABS:                        11.2   3.49  )-----------( 231      ( 29 Dec 2022 05:30 )             33.7     12-29    145  |  x   |  10  ----------------------------<  121<H>  3.6   |  27  |  0.54    Ca    8.6      29 Dec 2022 05:30  Phos  4.1     12-29  Mg     2.0     12-29      PT/INR - ( 27 Dec 2022 12:00 )   PT: 13.2 sec;   INR: 1.11          PTT - ( 27 Dec 2022 12:00 )  PTT:36.8 sec    CAPILLARY BLOOD GLUCOSE            RADIOLOGY & ADDITIONAL STUDIES:   SUBJECTIVE:  Patient seen and examined on AM rounds. No acute events overnight. Patient is in good spirits today. Reports some abdominal pain, but controlled with pain medications. Denies nausea, vomiting, fever, and chills. Has return of bowel function. Voiding without issue. Walking around the unit.     MEDICATIONS  (STANDING):  chlorhexidine 2% Cloths 1 Application(s) Topical <User Schedule>  dextrose 10% Bolus 250 milliLiter(s) IV Bolus once  dextrose 5% + lactated ringers. 1000 milliLiter(s) (100 mL/Hr) IV Continuous <Continuous>  fat emulsion (Fish Oil and Plant Based) 20% Infusion 0.7874 Gm/kG/Day (20.83 mL/Hr) IV Continuous <Continuous>  heparin   Injectable 5000 Unit(s) SubCutaneous every 8 hours  influenza   Vaccine 0.5 milliLiter(s) IntraMuscular once  Parenteral Nutrition - Adult 1 Each (50 mL/Hr) TPN Continuous <Continuous>    MEDICATIONS  (PRN):  benzocaine 15 mG/menthol 3.6 mG Lozenge 1 Lozenge Oral every 2 hours PRN Sore Throat  benzocaine 20% Spray 1 Spray(s) Topical once PRN NGT discomfort  HYDROmorphone  Injectable 0.25 milliGRAM(s) IV Push once PRN Severe Pain (7 - 10)  ondansetron Injectable 4 milliGRAM(s) IV Push every 3 hours PRN Nausea and/or Vomiting  sodium chloride 0.9% lock flush 10 milliLiter(s) IV Push every 1 hour PRN Pre/post blood products, medications, blood draw, and to maintain line patency      Vital Signs Last 24 Hrs  T(C): 36.2 (29 Dec 2022 05:38), Max: 37.2 (29 Dec 2022 00:21)  T(F): 97.1 (29 Dec 2022 05:38), Max: 98.9 (29 Dec 2022 00:21)  HR: 64 (29 Dec 2022 05:38) (64 - 81)  BP: 100/63 (29 Dec 2022 05:38) (100/55 - 129/70)  BP(mean): --  RR: 16 (29 Dec 2022 05:38) (16 - 17)  SpO2: 97% (29 Dec 2022 05:38) (94% - 99%)    Parameters below as of 29 Dec 2022 05:38  Patient On (Oxygen Delivery Method): room air    Physical Exam:  General: NAD, resting comfortably in bed  Pulmonary: Nonlabored breathing, no respiratory distress  Cardiovascular: NSR  Abdominal: soft, NT/ND, ostomy with stool present  Extremities: WWP, normal strength    I&O's Summary    28 Dec 2022 07:01  -  29 Dec 2022 07:00  --------------------------------------------------------  IN: 3795.4 mL / OUT: 1925 mL / NET: 1870.4 mL        LABS:                        11.2   3.49  )-----------( 231      ( 29 Dec 2022 05:30 )             33.7     12-29    145  |  x   |  10  ----------------------------<  121<H>  3.6   |  27  |  0.54    Ca    8.6      29 Dec 2022 05:30  Phos  4.1     12-29  Mg     2.0     12-29      PT/INR - ( 27 Dec 2022 12:00 )   PT: 13.2 sec;   INR: 1.11          PTT - ( 27 Dec 2022 12:00 )  PTT:36.8 sec    CAPILLARY BLOOD GLUCOSE            RADIOLOGY & ADDITIONAL STUDIES:

## 2023-01-04 DIAGNOSIS — E44.0 MODERATE PROTEIN-CALORIE MALNUTRITION: ICD-10-CM

## 2023-01-04 DIAGNOSIS — K56.609 UNSPECIFIED INTESTINAL OBSTRUCTION, UNSPECIFIED AS TO PARTIAL VERSUS COMPLETE OBSTRUCTION: ICD-10-CM

## 2023-01-04 DIAGNOSIS — D64.9 ANEMIA, UNSPECIFIED: ICD-10-CM

## 2023-01-04 DIAGNOSIS — Z92.3 PERSONAL HISTORY OF IRRADIATION: ICD-10-CM

## 2023-01-04 DIAGNOSIS — C20 MALIGNANT NEOPLASM OF RECTUM: ICD-10-CM

## 2023-01-04 DIAGNOSIS — Z23 ENCOUNTER FOR IMMUNIZATION: ICD-10-CM

## 2023-01-04 DIAGNOSIS — K56.50 INTESTINAL ADHESIONS [BANDS], UNSPECIFIED AS TO PARTIAL VERSUS COMPLETE OBSTRUCTION: ICD-10-CM

## 2023-01-04 DIAGNOSIS — Z92.21 PERSONAL HISTORY OF ANTINEOPLASTIC CHEMOTHERAPY: ICD-10-CM

## 2023-03-13 ENCOUNTER — OFFICE VISIT (OUTPATIENT)
Dept: PRIMARY CARE CLINIC | Age: 43
End: 2023-03-13
Payer: COMMERCIAL

## 2023-03-13 VITALS
OXYGEN SATURATION: 98 % | HEART RATE: 64 BPM | SYSTOLIC BLOOD PRESSURE: 111 MMHG | BODY MASS INDEX: 23.63 KG/M2 | HEIGHT: 65 IN | TEMPERATURE: 97.7 F | DIASTOLIC BLOOD PRESSURE: 73 MMHG | WEIGHT: 141.8 LBS | RESPIRATION RATE: 16 BRPM

## 2023-03-13 DIAGNOSIS — Z13.6 ENCOUNTER FOR LIPID SCREENING FOR CARDIOVASCULAR DISEASE: ICD-10-CM

## 2023-03-13 DIAGNOSIS — Z11.59 NEED FOR HEPATITIS C SCREENING TEST: ICD-10-CM

## 2023-03-13 DIAGNOSIS — Z78.9 VEGAN DIET: ICD-10-CM

## 2023-03-13 DIAGNOSIS — B00.9 HERPES: ICD-10-CM

## 2023-03-13 DIAGNOSIS — Z13.220 ENCOUNTER FOR LIPID SCREENING FOR CARDIOVASCULAR DISEASE: ICD-10-CM

## 2023-03-13 DIAGNOSIS — C20 RECTAL CANCER (HCC): Primary | ICD-10-CM

## 2023-03-13 DIAGNOSIS — Z29.9 PREVENTIVE MEASURE: ICD-10-CM

## 2023-03-13 PROCEDURE — 99204 OFFICE O/P NEW MOD 45 MIN: CPT | Performed by: FAMILY MEDICINE

## 2023-03-13 NOTE — PROGRESS NOTES
Room 9     Identified pt with two pt identifiers(name and ). Reviewed record in preparation for visit and have obtained necessary documentation. All patient medications has been reviewed. Chief Complaint   Patient presents with    New Patient    Establish Care     Patient has a new diagnosis of stage 3 C rectal cancer; pt has a permeant colostomy surgery 22; pt states violent vomiting 12/15/22 pt has an small bowel obstruction; pt had emergency surgery 22 to clip the bowel;        3 most recent PHQ Screens 3/13/2023   Little interest or pleasure in doing things Not at all   Feeling down, depressed, irritable, or hopeless Several days   Total Score PHQ 2 1     No flowsheet data found. Health Maintenance Due   Topic    Hepatitis C Screening     Depression Screen     COVID-19 Vaccine (1)    DTaP/Tdap/Td series (1 - Tdap)    Cervical cancer screen     Lipid Screen     Flu Vaccine (1)         Health Maintenance Review: Patient reminded of \"due or due soon\" health maintenance. I have asked the patient to contact his/her primary care provider (PCP) for follow-up on his/her health maintenance. Vitals:    23 1045   BP: 111/73   Pulse: 64   Resp: 16   Temp: 97.7 °F (36.5 °C)   TempSrc: Oral   SpO2: 98%   Weight: 141 lb 12.8 oz (64.3 kg)   Height: 5' 5\" (1.651 m)   PainSc:   0 - No pain       Wt Readings from Last 3 Encounters:   23 141 lb 12.8 oz (64.3 kg)   12/15/22 139 lb (63 kg)     Temp Readings from Last 3 Encounters:   23 97.7 °F (36.5 °C) (Oral)   22 97.9 °F (36.6 °C)     BP Readings from Last 3 Encounters:   23 111/73   22 117/74     Pulse Readings from Last 3 Encounters:   23 64   22 65       Coordination of Care Questionnaire:   1) Have you been to an emergency room, urgent care, or hospitalized since your last visit? yes   12/15/21, 23    2. Have seen or consulted any other health care provider since your last visit?  YES    Patient is accompanied by self and  I have received verbal consent from Navid Harrell to discuss any/all medical information while they are present in the room.

## 2023-03-13 NOTE — PROGRESS NOTES
HPI     Chief Complaint   Patient presents with    New Patient    Establish Care     Patient has a new diagnosis of stage 3 C rectal cancer; pt has a permeant colostomy surgery 9/26/22; pt states violent vomiting 12/15/22 pt has an small bowel obstruction; pt had emergency surgery 12/27/22 to clip the bowel; She is a 37 y.o. female who presents for establishing care. Rectal Cancer - She has a fam hx of colon cancer in maternal and paternal GM. Had been having blood in her stool for 2 years. She started having anal itching. Was then diagnosed with yeast and given Diflucan and itching went away. Continued to have blood in her stool. States in November 2021 when out of town she had stomach upset she blamed on eating cheese. On 12/22/21 she went to an a weekend clinic at [facility] and they referred her for a colonoscopy. Had colonoscopy 2/7/22. Diagnosed 2/10/22 after bx. At first thought it was anal cancer. They did multiple pathology reports and eventually a second bx after difficulty determining diagnosis. Started treatment for rectal cancer 3/16/22. Responded well to chemo. She then went to [facility] half way through her chemo and started radiation therapy which she had 27 treatments for. Then returned home and finished chemo. She is still followed by [facility] for care. Went in August 2022 and they did scans and still saw a tumor and she underwent surgery and had permanent colostomy (omental flap, APR and colostomy) with Dr. Nadia James. When path came back everything was negative for cancer she states including tumor and lymph nodes that were removed. They are monitoring L inguinal lymph nodes. She had a SBO and underwent emergency surgery to clip scar tissue 12/17/22. She had scans in December 2022 and [facility] decided they would follow from [facility]. Sees [facility] in Dec and June.  Gets MRI, CT and residual disease blood test and then in March and September she has a phlebotomist that comes to house for further testing. Did undergo full genetic testing which came back negative. Dr. Kamilah Parks is current oncologist. Currently no evidence of active disease she states. States she was attacked by a dog in . States she had to have facial reconstruction and dental procedures after the injury. She is working with PT for her knee. She does jog typically but has not been doing as much with her recent medical issues. Thought it might be IT issues but now thinking it may be more of a back issue. Had pelvic floor issues after having her son and saw a pelvic floor PT which did help. She is now seeing her again since her rectal surgery and treatments. She is vegan. She does see a therapist since she was in high school. Has not been on medication. No known diagnosis of depression, anxiety. She is followed by Dr. Sara Fisher with [address] [place] [address] and had PAP in 2022. She had an abnormal PAP in . Not sure of the results but thinks HPV many have been mentioned. Hx of herpes. She had a mammogram in 2022. Done at [facility]. She had a paternal great grandfather who  of stomach cancer. Review of Systems   Constitutional:  Negative for chills and fever. Psychiatric/Behavioral:  Negative for dysphoric mood. The patient is not nervous/anxious. Reviewed PmHx, RxHx, FmHx, SocHx, AllgHx and updated and dated in the chart. Physical Exam:  Visit Vitals  /73 (BP 1 Location: Left upper arm, BP Patient Position: Sitting)   Pulse 64   Temp 97.7 °F (36.5 °C) (Oral)   Resp 16   Ht 5' 5\" (1.651 m)   Wt 141 lb 12.8 oz (64.3 kg)   SpO2 98%   BMI 23.60 kg/m²     Physical Exam  Vitals and nursing note reviewed. Constitutional:       General: She is not in acute distress. Appearance: Normal appearance. She is not ill-appearing. Cardiovascular:      Rate and Rhythm: Normal rate and regular rhythm. Heart sounds: No murmur heard.   Pulmonary:      Effort: Pulmonary effort is normal. No respiratory distress. Breath sounds: Normal breath sounds. No wheezing, rhonchi or rales. Abdominal:      General: There is no distension. Palpations: Abdomen is soft. Tenderness: There is no abdominal tenderness. There is no guarding or rebound. Comments: Colostomy in place. Neurological:      General: No focal deficit present. Mental Status: She is alert. Psychiatric:         Mood and Affect: Mood normal.         Behavior: Behavior normal.     No results found for this or any previous visit (from the past 12 hour(s)). Assessment / Plan     Diagnoses and all orders for this visit:    1. Rectal cancer [facility]) - Request patient fill out release for records from [facility]. 2. Preventive measure  -     CBC WITH AUTOMATED DIFF; Future  -     METABOLIC PANEL, COMPREHENSIVE; Future    3. Encounter for lipid screening for cardiovascular disease  -     LIPID PANEL; Future    4. Need for hepatitis C screening test  -     HEPATITIS C AB; Future    5. Herpes    6. Vegan diet     She is interested in yearly PAP given her hx. We discussed current guidelines but if she would like to do yearly PAP or is recommended by Onc she can follow up for procedure and to schedule appt. I have discussed the diagnosis with the patient and the intended plan as seen in the above orders. The patient has received an after-visit summary and questions were answered concerning future plans. I have discussed medication side effects and warnings with the patient as well. I spent a total of 49 minutes in both face to face and in non face to face activities for the visit on the date of this encounter.      Jc Boateng, DO

## 2023-07-28 NOTE — PATIENT PROFILE ADULT - FUNCTIONAL SCREEN CURRENT LEVEL: COMMUNICATION, MLM
Wt Readings from Last 3 Encounters:   07/28/23 59.4 kg (131 lb)   04/28/23 61 kg (134 lb 6.4 oz)   03/28/23 65.7 kg (144 lb 12.8 oz)     Weight is stable. Continue current. 0 = understands/communicates without difficulty

## 2023-11-29 NOTE — DISCHARGE INSTRUCTIONS
Breast Biopsy Discharge Instructions    PAIN CONTROL     You may have mild discomfort; take 1-2 Tylenol every 4-6 hours as needed.  Do not take aspirin containing products or anti-inflammatory medications (Advil, Aleve, Motrin, Ibuprofen, etc.) for 24 hours.  Wearing a comfortable bra for support may help with discomfort. WOUND CARE      A small amount of bleeding or bruising at the biopsy site is normal. Watch for signs and symptoms of infections: skin warm to touch, yellowish drainage from wound, fever or severe pain.  Place an ice pack over the site hourly, 20-30 at a time for a few hours today.  The clear dressing is water resistant (you may shower, but do not allow the dressing to become saturated). You may remove the dressing in 48 hours. The steri-strips (small pieces of tape covering the incision) will fall off on their own in a few days. If the clear dressing irritates your skin or begins to peel off, you may remove it. Remember, if you remove the clear dressing before 48 hours, you should not get the site wet.  If at any point you have EXCESSIVE BLEEDING (saturating the gauze under the clear dressing) OR pain please call:    Daytime 7:30am-5:00pm: [facility] [phone]    After Hours:    [phone] (ask to speak to a radiologist)              or [facility] [phone]    ACTIVITY     Do not participate in any strenuous activities for 24-48 hours (exercise, sports, housework, etc.).  You may resume work (light duty only) for the first 24-48 hours.  No heavy lifting with the arm on the affected side of your body. ADDITIONAL INSTRUCTIONS    We will call you with results on Tuesday August 7 in the afternoon.        Saadia JACK MD: Daniel Mccarthy   RN: Charley Forrest  Radiology Tech: Ana Srinivasan
Remained on unit
